# Patient Record
Sex: FEMALE | Race: AMERICAN INDIAN OR ALASKA NATIVE
[De-identification: names, ages, dates, MRNs, and addresses within clinical notes are randomized per-mention and may not be internally consistent; named-entity substitution may affect disease eponyms.]

---

## 2019-03-29 NOTE — XRAY REPORT
PROCEDURES:  XR CHEST 1V AP 

 

TECHNIQUE:  AP portable view of the chest. 

 

HISTORY: sob 

 

COMPARISON:  None 

 

FINDINGS: 

 

Lines, tubes, and devices: N/A 

 

Lungs and pleura: Trachea is normal in position. Lungs are clear of infiltrate, pleural effusion, vas
cular congestion, or pneumothorax. Elevation of the left hemidiaphragm is noted. 

 

Cardiomediastinal silhouette: Aorta is unfolded, likely age-related. Cardiac and mediastinal silhouet
rito are otherwise unremarkable. 

 

Other: Bony structures are intact. 

 

 

IMPRESSION: 

 

No acute cardiopulmonary process seen. 

 

This document is electronically signed by Jossie Asher MD., March 29 2019 04:50:50 PM ET

## 2019-03-29 NOTE — EMERGENCY DEPARTMENT REPORT
ED General Adult HPI





- General


Chief complaint: Dizziness


Stated complaint: FEELING FAINT


Time Seen by Provider: 03/29/19 14:51


Source: patient


Mode of arrival: Stretcher


Limitations: No Limitations





- History of Present Illness


Initial comments: 





87-year-old female with history of dementia, CVA, PVCs, presents to ED with 

complaint of dizziness and shortness of breath.  The patient was outpatient 

imaging to undergo brain MRI and she began feeling lightheaded as if she is 

gonna pass out.  Patient reports associated shortness of breath.  Denies 

headache, chest pain, vomiting, diarrhea, fever, abdominal pain.


-: This afternoon


Severity scale (0 -10): 0


Consistency: intermittent


Improves with: none


Worsens with: none


Associated Symptoms: shortness of breath.  denies: chest pain, cough, 

fever/chills, headaches, nausea/vomiting





- Related Data


                                    Allergies











Allergy/AdvReac Type Severity Reaction Status Date / Time


 


No Known Allergies Allergy   Unverified 03/29/19 15:01














ED Review of Systems


ROS: 


Stated complaint: FEELING FAINT


Other details as noted in HPI





Comment: All other systems reviewed and negative


Constitutional: denies: chills, fever


Respiratory: shortness of breath.  denies: cough


Cardiovascular: denies: chest pain


Gastrointestinal: denies: abdominal pain, nausea, vomiting, diarrhea


Neurological: denies: headache





ED Past Medical Hx





- Past Medical History


Previous Medical History?: Yes


Additional medical history: bld clots, a-fib





- Social History


Smoking Status: Never Smoker


Substance Use Type: None





ED Physical Exam





- General


Limitations: No Limitations


General appearance: alert, in no apparent distress





- Head


Head exam: Present: atraumatic, normocephalic





- Eye


Eye exam: Present: normal appearance





- ENT


ENT exam: Present: mucous membranes moist





- Neck


Neck exam: Present: normal inspection





- Respiratory


Respiratory exam: Present: normal lung sounds bilaterally.  Absent: respiratory 

distress





- Cardiovascular


Cardiovascular Exam: Present: regular rate, normal rhythm





- GI/Abdominal


GI/Abdominal exam: Present: soft.  Absent: distended, tenderness





- Extremities Exam


Extremities exam: Present: normal inspection





- Neurological Exam


Neurological exam: Present: alert.  Absent: oriented X3 (oriented x 2, baseline 

per daughter)





- Psychiatric


Psychiatric exam: Present: normal affect, normal mood





- Skin


Skin exam: Present: warm, dry, intact, normal color





ED Course


                                   Vital Signs











  03/29/19 03/29/19 03/29/19





  14:51 15:00 15:01


 


Temperature   98.0 F


 


Pulse Rate  95 H 99 H


 


Respiratory 16 21 16





Rate   


 


Blood Pressure  142/91 143/92


 


O2 Sat by Pulse   98





Oximetry   














  03/29/19 03/29/19 03/29/19





  15:30 15:46 16:16


 


Temperature   


 


Pulse Rate 91 H 83 80


 


Respiratory 20 19 16





Rate   


 


Blood Pressure 146/85 142/91 135/85


 


O2 Sat by Pulse   





Oximetry   














  03/29/19 03/29/19 03/29/19





  16:46 18:08 18:16


 


Temperature   


 


Pulse Rate 81 92 H 77


 


Respiratory 11 L 21 22





Rate   


 


Blood Pressure 138/85 143/66 154/95


 


O2 Sat by Pulse   





Oximetry   














  03/29/19 03/29/19 03/29/19





  18:30 18:46 19:00


 


Temperature   


 


Pulse Rate 76 80 95 H


 


Respiratory 15 13 16





Rate   


 


Blood Pressure 143/66 141/82 141/82


 


O2 Sat by Pulse   





Oximetry   














- Reevaluation(s)


Reevaluation #1: 





03/29/19 19:09


Explained CT findings to daughter.  States she has known PE and is currently on 

Eliquis.





ED Medical Decision Making





- Lab Data


Result diagrams: 


                                 03/29/19 15:43





                                 03/29/19 15:43





- EKG Data


-: EKG Interpreted by Me


EKG shows normal: sinus rhythm, ST-T waves





- EKG Data


Interpretation: other (bigeminy)





- Radiology Data


Radiology results: report reviewed





- Medical Decision Making





87-year-old female with dementia complaining of dizziness and shortness of 

breath.  Patient denies respiratory distress, lungs are clear, O2 sats normal.  

Vital signs normal.  EKG shows bigeminy, however on the monitor, patient goes in

and out of bigeminy and sinus rhythm with occasional PVCs.  Daughter reports 

patient has issues PVCs.  Patient denies chest pain, troponin negative.  UA 

shows UTI, rocephin given.  CT head negative.  CTA Chest obtained due to 

elevated D-dimer.  CTA Chest shows subsegmental clots, but no large, saddle PE. 

Daughter then states pt has history of PE and currently taking Eliquis.  Spoke 

w/ hospitalist, Dr Feliciano, will admit for further workup.  States he will place 

orders for anticoagulation.  Requests bridge orders be placed.





- Differential Diagnosis


pneumonia, CHF, ACS, CVA, PE


Critical care attestation.: 


If time is entered above; I have spent that time in minutes in the direct care 

of this critically ill patient, excluding procedure time.








ED Disposition


Clinical Impression: 


 Dizziness, Pulmonary embolism, UTI (urinary tract infection)





Disposition: DC-[id number] OP ADMIT IP TO THIS HOSP


Is pt being admited?: Yes


Condition: Stable


Referrals: 


[facility] [Other] - 3-5 Days


Time of Disposition: 19:13

## 2019-03-29 NOTE — CAT SCAN REPORT
PROCEDURE: CT ANGIO CHEST 

 

TECHNIQUE:  Following administration of IV contrast axial helical imaging was performed through the c
hest with sagittal and coronal reformatted images and maximum intensity projection images obtained. 

 

HISTORY: sob, elevated d-dimer 

 

COMPARISONS: Chest x-ray also performed today  

 

FINDINGS: 

There is no evidence of infiltrate, pneumothorax or pleural fluid collection. 

The trachea and bronchi are patent. 

 

The heart is enlarged. There is a small pericardial fluid collection. 

There is aneurysmal dilatation of the ascending thoracic aorta (4.4 cm) and measured at the level of 
the right pulmonary artery. 

There is no evidence of intrathoracic adenopathy. 

 

There are filling defects in the right lower lobe lobar and segmental pulmonary arteries consistent w
ith pulmonary artery emboli. There is also the appearance of a small pulmonary embolus in a left lowe
r lobe segmental pulmonary artery. 

 

The visualized portion of the upper abdomen is notable for well-defined areas of decreased density wi
thin the liver most likely represent cysts. 

The bony structures are are notable for spondylitic change of the thoracic spine. 

 

IMPRESSION:  

1. Evidence of pulmonary artery emboli. 

2. Cardiomegaly with small pericardial fluid collection. 

3. Aneurysmal dilatation of ascending thoracic aorta (4.4 cm). 

The above findings were discussed with Dr. Porras at 7:05 PM March 29, 2018. 

 

This document is electronically signed by Mignon Boland MD., March 29 2019 07:06:12 PM ET

## 2019-03-29 NOTE — CAT SCAN REPORT
PROCEDURE: CT HEAD/BRAIN WO CON 

 

TECHNIQUE:  Axial helical imaging from the skull base to the vertex. 

 

HISTORY: dizziness 

 

COMPARISONS: None  

 

FINDINGS: 

There is no evidence of an acute intracranial process, intracranial hemorrhage or mass effect. 

Ventricular size is concordant with the degree of atrophy. 

There is atherosclerotic vascular calcification of the internal carotid arteries and vertebral arteri
es bilaterally at the skull base. 

The visualized portions of the orbits, paranasal and mastoid sinuses are unremarkable. 

The bony structures are unremarkable. 

 

IMPRESSION: 

1. No evidence of an acute intracranial process, intracranial hemorrhage or mass effect. 

If there is a clinical suspicion of an acute intracranial process, MRI brain may be helpful. 

 

This document is electronically signed by Mignon Boland MD., March 29 2019 06:37:09 PM ET

## 2019-03-30 NOTE — VASCULAR LAB REPORT
VL VENOUS DUPLEX LE BILAT 

 

CLINICAL INDICATION: 

Female, 87 years of age. Pulmonary embolism . 

 

COMPARISON: 

None 

 

TECHNIQUE: 

Multiple gray-scale, color-flow and doppler waveform images were saved for the permanent digital medi
clair record. 

 

FINDINGS: 

The deep veins of the lower extremities are normal in appearance throughout.  Normal venous flow and 
compressibility is noted.  The visualized veins of the calves are also normal. 

 

IMPRESSION: 

No evidence of deep venous thrombosis of either lower extremity. 

 

This document is electronically signed by Yuliana Salas DO., March 30 2019 04:08:23 PM ET

## 2019-03-30 NOTE — PROGRESS NOTE
Assessment and Plan


Assessment and plan: 


Acute resp failure due to bilateral pulmonary embolism


Supplemental Oxygen


consult Pulmonology





Pulmonary embolism


Started on Lovenox 1mg/kg bid


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


Hold Eliquis.


Consulted pulm


will discuss with Pulm later.





History of Pulmonary embolism





History of DVT, details unavailable





Atrial fibrillation





UTI


Started on Ceftriaxone





Hypokalemia.


replace and recheck





Dementia.





FULL CODE STATUS.








History


Interval history: 


Shortness of breath


Chest pain








Hospitalist Physical





- Physical exam


Narrative exam: 


GEN: Not in acute distress, lying in bed, 


HEENT: Normocephalic, atraumatic, 


Neck: supple, No JVD


heart: S1 and S2 reg, no murmurs, rubs or gallop


Lungs: clear to auscultation bilateral, no crackles, no wheeze 


Abd:soft, non tender, non distended, normal bowel sounds


Ext:No edema, no clubbing


Neuro:Awake,alert,oriented X 3, no focal signs, moves all ext


Psych: normal mood











- Constitutional


Vitals: 


                                        











Temp Pulse Resp BP Pulse Ox


 


 97.1 F L  80   18   148/96   99 


 


 03/30/19 08:21  03/30/19 04:04  03/30/19 08:21  03/30/19 08:21  03/30/19 04:04














Results





- Labs


CBC & Chem 7: 


                                 03/30/19 12:54





                                 03/30/19 05:38


Labs: 


                             Laboratory Last Values











WBC  3.1 K/mm3 (4.5-11.0)  L  03/29/19  15:43    


 


RBC  4.48 M/mm3 (3.65-5.03)   03/29/19  15:43    


 


Hgb  13.0 gm/dl (10.1-14.3)   03/29/19  15:43    


 


Hct  38.3 % (30.3-42.9)   03/29/19  15:43    


 


MCV  86 fl (79-97)   03/29/19  15:43    


 


MCH  29 pg (28-32)   03/29/19  15:43    


 


MCHC  34 % (30-34)   03/29/19  15:43    


 


RDW  16.2 % (13.2-15.2)  H  03/29/19  15:43    


 


Plt Count  219 K/mm3 (140-440)   03/29/19  15:43    


 


Add Manual Diff  Complete   03/29/19  15:43    


 


Total Counted  100   03/29/19  15:43    


 


Seg Neuts % (Manual)  54.0 % (40.0-70.0)   03/29/19  15:43    


 


Band Neutrophils %  0 %  03/29/19  15:43    


 


Lymphocytes % (Manual)  35.0 % (13.4-35.0)   03/29/19  15:43    


 


Reactive Lymphs % (Man)  0 %  03/29/19  15:43    


 


Monocytes % (Manual)  11.0 % (0.0-7.3)  H  03/29/19  15:43    


 


Eosinophils % (Manual)  0 % (0.0-4.3)   03/29/19  15:43    


 


Basophils % (Manual)  0 % (0.0-1.8)   03/29/19  15:43    


 


Metamyelocytes %  0 %  03/29/19  15:43    


 


Myelocytes %  0 %  03/29/19  15:43    


 


Promyelocytes %  0 %  03/29/19  15:43    


 


Blast Cells %  0 %  03/29/19  15:43    


 


Nucleated RBC %  Not Reportable   03/29/19  15:43    


 


Seg Neutrophils # Man  1.7 K/mm3 (1.8-7.7)  L  03/29/19  15:43    


 


Band Neutrophils #  0.0 K/mm3  03/29/19  15:43    


 


Lymphocytes # (Manual)  1.1 K/mm3 (1.2-5.4)  L  03/29/19  15:43    


 


Abs React Lymphs (Man)  0.0 K/mm3  03/29/19  15:43    


 


Monocytes # (Manual)  0.3 K/mm3 (0.0-0.8)   03/29/19  15:43    


 


Eosinophils # (Manual)  0.0 K/mm3 (0.0-0.4)   03/29/19  15:43    


 


Basophils # (Manual)  0.0 K/mm3 (0.0-0.1)   03/29/19  15:43    


 


Metamyelocytes #  0.0 K/mm3  03/29/19  15:43    


 


Myelocytes #  0.0 K/mm3  03/29/19  15:43    


 


Promyelocytes #  0.0 K/mm3  03/29/19  15:43    


 


Blast Cells #  0.0 K/mm3  03/29/19  15:43    


 


WBC Morphology  Not Reportable   03/29/19  15:43    


 


Hypersegmented Neuts  Not Reportable   03/29/19  15:43    


 


Hyposegmented Neuts  Not Reportable   03/29/19  15:43    


 


Hypogranular Neuts  Not Reportable   03/29/19  15:43    


 


Smudge Cells  Not Reportable   03/29/19  15:43    


 


Toxic Granulation  Not Reportable   03/29/19  15:43    


 


Toxic Vacuolation  Not Reportable   03/29/19  15:43    


 


Dohle Bodies  Not Reportable   03/29/19  15:43    


 


Pelger-Huet Anomaly  Not Reportable   03/29/19  15:43    


 


Gabino Rods  Not Reportable   03/29/19  15:43    


 


Platelet Estimate  Consistent w auto   03/29/19  15:43    


 


Clumped Platelets  Not Reportable   03/29/19  15:43    


 


Plt Clumps, EDTA  Not Reportable   03/29/19  15:43    


 


Large Platelets  Not Reportable   03/29/19  15:43    


 


Giant Platelets  Not Reportable   03/29/19  15:43    


 


Platelet Satelliting  Not Reportable   03/29/19  15:43    


 


Plt Morphology Comment  Not Reportable   03/29/19  15:43    


 


RBC Morphology  Not Reportable   03/29/19  15:43    


 


Dimorphic RBCs  Not Reportable   03/29/19  15:43    


 


Polychromasia  Not Reportable   03/29/19  15:43    


 


Hypochromasia  Not Reportable   03/29/19  15:43    


 


Poikilocytosis  1+   03/29/19  15:43    


 


Anisocytosis  1+   03/29/19  15:43    


 


Microcytosis  Not Reportable   03/29/19  15:43    


 


Macrocytosis  Not Reportable   03/29/19  15:43    


 


Spherocytes  Not Reportable   03/29/19  15:43    


 


Pappenheimer Bodies  Not Reportable   03/29/19  15:43    


 


Sickle Cells  Not Reportable   03/29/19  15:43    


 


Target Cells  Not Reportable   03/29/19  15:43    


 


Tear Drop Cells  Not Reportable   03/29/19  15:43    


 


Ovalocytes  Few   03/29/19  15:43    


 


Helmet Cells  Not Reportable   03/29/19  15:43    


 


[place] Bodies  Not Reportable   03/29/19  15:43    


 


Cabot Rings  Not Reportable   03/29/19  15:43    


 


Kendal Cells  Not Reportable   03/29/19  15:43    


 


Bite Cells  Not Reportable   03/29/19  15:43    


 


Crenated Cell  Not Reportable   03/29/19  15:43    


 


Elliptocytes  Few   03/29/19  15:43    


 


Acanthocytes (Spur)  Not Reportable   03/29/19  15:43    


 


Rouleaux  Not Reportable   03/29/19  15:43    


 


Hemoglobin C Crystals  Not Reportable   03/29/19  15:43    


 


Schistocytes  Not Reportable   03/29/19  15:43    


 


Malaria parasites  Not Reportable   03/29/19  15:43    


 


Michael Bodies  Not Reportable   03/29/19  15:43    


 


Hem Pathologist Commnt  No   03/29/19  15:43    


 


PT  13.6 Sec. (12.2-14.9)   03/29/19  15:43    


 


INR  0.98  (0.87-1.13)   03/29/19  15:43    


 


APTT  25.1 Sec. (24.2-36.6)   03/29/19  15:43    


 


D-Dimer  1149.82 ng/mlDDU (0-234)  H  03/29/19  15:43    


 


Sodium  142 mmol/L (137-145)   03/30/19  05:38    


 


Potassium  3.0 mmol/L (3.6-5.0)  L  03/30/19  05:38    


 


Chloride  100.7 mmol/L ()   03/30/19  05:38    


 


Carbon Dioxide  23 mmol/L (22-30)   03/30/19  05:38    


 


Anion Gap  21 mmol/L  03/30/19  05:38    


 


BUN  5 mg/dL (7-17)  L  03/30/19  05:38    


 


Creatinine  0.4 mg/dL (0.7-1.2)  L  03/30/19  05:38    


 


Estimated GFR  > 60 ml/min  03/30/19  05:38    


 


BUN/Creatinine Ratio  13 %  03/30/19  05:38    


 


Glucose  83 mg/dL ()   03/30/19  05:38    


 


Hemoglobin A1c  4.8 % (4-6)   03/30/19  00:27    


 


Calcium  8.8 mg/dL (8.4-10.2)   03/30/19  05:38    


 


Total Bilirubin  0.70 mg/dL (0.1-1.2)   03/30/19  05:38    


 


AST  21 units/L (5-40)   03/30/19  05:38    


 


ALT  6 units/L (7-56)  L  03/30/19  05:38    


 


Alkaline Phosphatase  39 units/L ()   03/30/19  05:38    


 


Troponin T  < 0.010 ng/mL (0.00-0.029)   03/29/19  21:19    


 


Total Protein  5.6 g/dL (6.3-8.2)  L  03/30/19  05:38    


 


Albumin  2.9 g/dL (3.9-5)  L  03/30/19  05:38    


 


Albumin/Globulin Ratio  1.1 %  03/30/19  05:38    


 


Urine Color  Yellow  (Yellow)   03/29/19  19:00    


 


Urine Turbidity  Clear  (Clear)   03/29/19  19:00    


 


Urine pH  6.0  (5.0-7.0)   03/29/19  19:00    


 


Urine Protein  <15 mg/dl mg/dL (Negative)   03/29/19  19:00    


 


Urine Glucose (UA)  Neg mg/dL (Negative)   03/29/19  19:00    


 


Urine Ketones  80 mg/dL (Negative)   03/29/19  19:00    


 


Urine Blood  Neg  (Negative)   03/29/19  19:00    


 


Urine Nitrite  Neg  (Negative)   03/29/19  19:00    


 


Urine Bilirubin  Neg  (Negative)   03/29/19  19:00    


 


Urine Urobilinogen  4.0 mg/dL (<2.0)   03/29/19  19:00    


 


Ur Leukocyte Esterase  Mod  (Negative)   03/29/19  19:00    


 


Urine WBC (Auto)  27.0 /HPF (0.0-6.0)  H  03/29/19  19:00    


 


Urine RBC (Auto)  5.0 /HPF (0.0-6.0)   03/29/19  19:00    


 


U Epithel Cells (Auto)  1.0 /HPF (0-13.0)   03/29/19  19:00    


 


Urine Mucus  2+ /HPF  03/29/19  19:00    














Active Medications





- Current Medications


Current Medications: 














Generic Name Dose Route Start Last Admin





  Trade Name Freq  PRN Reason Stop Dose Admin


 


Acetaminophen  650 mg  03/29/19 23:26 





  Tylenol  PO  





  Q4H PRN  





  Pain MILD(1-3)/Fever >100.5/HA  


 


Apixaban  5 mg  03/30/19 10:00  03/30/19 09:15





  Eliquis  PO   5 mg





  DAILY GERARDO   Administration





  Protocol  


 


Enoxaparin Sodium  60 mg  03/30/19 10:00  03/30/19 09:14





  Lovenox  SUB-Q   60 mg





  BID GERARDO   Administration


 


Hydromorphone HCl  0.25 mg  03/29/19 23:26 





  Dilaudid  IV  





  Q3H PRN  





  Pain, Moderate (4-6)  


 


Sodium Chloride  1,000 mls @ 75 mls/hr  03/29/19 23:45  03/29/19 23:54





  Nacl 0.9% 1000 Ml  IV   75 mls/hr





  AS DIRECT GERARDO   Administration


 


Ceftriaxone Sodium  1 gm in 50 mls @ 100 mls/hr  03/30/19 10:00  03/30/19 09:56





  Rocephin/Ns 1 Gm/50 Ml  IV   100 mls/hr





  Q24HR GERARDO   Administration





  Protocol  


 


Ondansetron HCl  4 mg  03/29/19 23:26  03/30/19 09:32





  Zofran  IV   4 mg





  Q8H PRN   Administration





  Nausea And Vomiting  


 


Oxycodone/Acetaminophen  1 tab  03/29/19 23:26  03/30/19 09:32





  Percocet 5/325  PO   1 tab





  Q6H PRN   Administration





  Pain, Moderate (4-6)  


 


Potassium Chloride  40 meq  03/30/19 11:00 





  Potassium Chloride  FEEDTUBE  03/30/19 17:01 





  Q6H GERARDO  


 


Sodium Chloride  10 ml  03/30/19 10:00  03/30/19 09:38





  Sodium Chloride Flush Syringe 10 Ml  IV   10 ml





  BID GERARDO   Administration


 


Sodium Chloride  10 ml  03/29/19 23:26 





  Sodium Chloride Flush Syringe 10 Ml  IV  





  PRN PRN  





  LINE FLUSH

## 2019-03-30 NOTE — HISTORY AND PHYSICAL REPORT
History of Present Illness


Date of examination: 03/29/19


Date of admission: 


03/29/19 19:14





Chief complaint: 


SOB since am





History of present illness: 





87-year-old female with history of dementia, CVA, PVCs, presents to ED with 

complaint of dizziness and shortness of breath.  The patient was  at outpatient 

imaging to undergo brain MRI and she began feeling lightheaded as if she is 

gonna pass out.  Patient reports associated shortness of breath.  Denies 

headache, chest pain, vomiting, diarrhea, fever, abdominal pain.


Unable to do her ADL's.


Lives with Grand daughter


Daughter does not want SNF at this point of time








Past Medical History  


Previous Medical History?: Yes


Additional medical history: bld clots, a-fib,PE  





Past surgical history


n/a





Social History  


Smoking Status: Never Smoker


Substance Use Type: None  





 Family History


Htn











Review of Systems


ROS: 


Stated complaint: FEELING FAINT


Other details as noted in HPI





Comment: All other systems reviewed and negative


Constitutional: denies: chills, fever


Respiratory: shortness of breath.  denies: cough


Cardiovascular: denies: chest pain


Gastrointestinal: denies: abdominal pain, nausea, vomiting, diarrhea


Neurological: denies: headache














Medications and Allergies


                                    Allergies











Allergy/AdvReac Type Severity Reaction Status Date / Time


 


No Known Allergies Allergy   Verified 03/29/19 23:19











                                Home Medications











 Medication  Instructions  Recorded  Confirmed  Last Taken  Type


 


Apixaban [Eliquis] 5 mg PO DAILY 03/29/19 03/29/19 Unknown History











Active Meds: 


Active Medications





Acetaminophen (Tylenol)  650 mg PO Q4H PRN


   PRN Reason: Pain MILD(1-3)/Fever >100.5/HA


Apixaban (Eliquis)  5 mg PO DAILY [facility]; Protocol


Enoxaparin Sodium (Lovenox)  60 mg SUB-Q BID [facility]


Hydromorphone HCl (Dilaudid)  0.25 mg IV Q3H PRN


   PRN Reason: Pain, Moderate (4-6)


Sodium Chloride (Nacl 0.9% 1000 Ml)  1,000 mls @ 75 mls/hr IV AS DIRECT [facility]


   Last Admin: 03/29/19 23:54 Dose:  75 mls/hr


   Documented by: 


Ondansetron HCl (Zofran)  4 mg IV Q8H PRN


   PRN Reason: Nausea And Vomiting


Oxycodone/Acetaminophen (Percocet 5/325)  1 tab PO Q6H PRN


   PRN Reason: Pain, Moderate (4-6)


Sodium Chloride (Sodium Chloride Flush Syringe 10 Ml)  10 ml IV BID [facility]ARDO


Sodium Chloride (Sodium Chloride Flush Syringe 10 Ml)  10 ml IV PRN PRN


   PRN Reason: LINE FLUSH











Exam





- Constitutional


Vitals: 


                                        











Temp Pulse Resp BP Pulse Ox


 


 97.6 F   80   18   146/86   99 


 


 03/30/19 04:04  03/30/19 04:04  03/30/19 04:04  03/30/19 04:04  03/30/19 04:04











General appearance: Present: mild distress, well-nourished





- EENT


Eyes: Present: PERRL


ENT: hearing intact, clear oral mucosa





- Neck


Neck: Present: supple, normal ROM





- Respiratory


Respiratory effort: normal


Respiratory: bilateral: CTA





- Cardiovascular


Heart rate: 92


Rhythm: regular


Heart Sounds: Present: S1 & S2.  Absent: rub, click





- Extremities


Extremities: no ischemia, pulses intact, pulses symmetrical, No edema


Peripheral Pulses: within normal limits





- Abdominal


General gastrointestinal: Present: soft, non-tender, non-distended, normal bowel

 sounds


Female genitourinary: Present: normal





- Rectal


Rectal Exam: deferred





- Integumentary


Integumentary: Present: clear, warm, dry





- Musculoskeletal


Musculoskeletal: gait normal, strength equal bilaterally





- Psychiatric


Psychiatric: appropriate mood/affect, intact judgment & insight





- Neurologic


Neurologic: CNII-XII intact, moves all extremities





- Allied Health


Allied health notes reviewed: nursing, case management





Results





- Labs


CBC & Chem 7: 


                                 03/29/19 15:43





                                 03/30/19 05:38


Labs: 


                             Laboratory Last Values











WBC  3.1 K/mm3 (4.5-11.0)  L  03/29/19  15:43    


 


RBC  4.48 M/mm3 (3.65-5.03)   03/29/19  15:43    


 


Hgb  13.0 gm/dl (10.1-14.3)   03/29/19  15:43    


 


Hct  38.3 % (30.3-42.9)   03/29/19  15:43    


 


MCV  86 fl (79-97)   03/29/19  15:43    


 


MCH  29 pg (28-32)   03/29/19  15:43    


 


MCHC  34 % (30-34)   03/29/19  15:43    


 


RDW  16.2 % (13.2-15.2)  H  03/29/19  15:43    


 


Plt Count  219 K/mm3 (140-440)   03/29/19  15:43    


 


Add Manual Diff  Complete   03/29/19  15:43    


 


Total Counted  100   03/29/19  15:43    


 


Seg Neuts % (Manual)  54.0 % (40.0-70.0)   03/29/19  15:43    


 


Band Neutrophils %  0 %  03/29/19  15:43    


 


Lymphocytes % (Manual)  35.0 % (13.4-35.0)   03/29/19  15:43    


 


Reactive Lymphs % (Man)  0 %  03/29/19  15:43    


 


Monocytes % (Manual)  11.0 % (0.0-7.3)  H  03/29/19  15:43    


 


Eosinophils % (Manual)  0 % (0.0-4.3)   03/29/19  15:43    


 


Basophils % (Manual)  0 % (0.0-1.8)   03/29/19  15:43    


 


Metamyelocytes %  0 %  03/29/19  15:43    


 


Myelocytes %  0 %  03/29/19  15:43    


 


Promyelocytes %  0 %  03/29/19  15:43    


 


Blast Cells %  0 %  03/29/19  15:43    


 


Nucleated RBC %  Not Reportable   03/29/19  15:43    


 


Seg Neutrophils # Man  1.7 K/mm3 (1.8-7.7)  L  03/29/19  15:43    


 


Band Neutrophils #  0.0 K/mm3  03/29/19  15:43    


 


Lymphocytes # (Manual)  1.1 K/mm3 (1.2-5.4)  L  03/29/19  15:43    


 


Abs React Lymphs (Man)  0.0 K/mm3  03/29/19  15:43    


 


Monocytes # (Manual)  0.3 K/mm3 (0.0-0.8)   03/29/19  15:43    


 


Eosinophils # (Manual)  0.0 K/mm3 (0.0-0.4)   03/29/19  15:43    


 


Basophils # (Manual)  0.0 K/mm3 (0.0-0.1)   03/29/19  15:43    


 


Metamyelocytes #  0.0 K/mm3  03/29/19  15:43    


 


Myelocytes #  0.0 K/mm3  03/29/19  15:43    


 


Promyelocytes #  0.0 K/mm3  03/29/19  15:43    


 


Blast Cells #  0.0 K/mm3  03/29/19  15:43    


 


WBC Morphology  Not Reportable   03/29/19  15:43    


 


Hypersegmented Neuts  Not Reportable   03/29/19  15:43    


 


Hyposegmented Neuts  Not Reportable   03/29/19  15:43    


 


Hypogranular Neuts  Not Reportable   03/29/19  15:43    


 


Smudge Cells  Not Reportable   03/29/19  15:43    


 


Toxic Granulation  Not Reportable   03/29/19  15:43    


 


Toxic Vacuolation  Not Reportable   03/29/19  15:43    


 


Dohle Bodies  Not Reportable   03/29/19  15:43    


 


Pelger-Huet Anomaly  Not Reportable   03/29/19  15:43    


 


Gabino Rods  Not Reportable   03/29/19  15:43    


 


Platelet Estimate  Consistent w auto   03/29/19  15:43    


 


Clumped Platelets  Not Reportable   03/29/19  15:43    


 


Plt Clumps, EDTA  Not Reportable   03/29/19  15:43    


 


Large Platelets  Not Reportable   03/29/19  15:43    


 


Giant Platelets  Not Reportable   03/29/19  15:43    


 


Platelet Satelliting  Not Reportable   03/29/19  15:43    


 


Plt Morphology Comment  Not Reportable   03/29/19  15:43    


 


RBC Morphology  Not Reportable   03/29/19  15:43    


 


Dimorphic RBCs  Not Reportable   03/29/19  15:43    


 


Polychromasia  Not Reportable   03/29/19  15:43    


 


Hypochromasia  Not Reportable   03/29/19  15:43    


 


Poikilocytosis  1+   03/29/19  15:43    


 


Anisocytosis  1+   03/29/19  15:43    


 


Microcytosis  Not Reportable   03/29/19  15:43    


 


Macrocytosis  Not Reportable   03/29/19  15:43    


 


Spherocytes  Not Reportable   03/29/19  15:43    


 


Pappenheimer Bodies  Not Reportable   03/29/19  15:43    


 


Sickle Cells  Not Reportable   03/29/19  15:43    


 


Target Cells  Not Reportable   03/29/19  15:43    


 


Tear Drop Cells  Not Reportable   03/29/19  15:43    


 


Ovalocytes  Few   03/29/19  15:43    


 


Helmet Cells  Not Reportable   03/29/19  15:43    


 


Avendaño-Guadalupe Bodies  Not Reportable   03/29/19  15:43    


 


Cabot Rings  Not Reportable   03/29/19  15:43    


 


Kendal Cells  Not Reportable   03/29/19  15:43    


 


Bite Cells  Not Reportable   03/29/19  15:43    


 


Crenated Cell  Not Reportable   03/29/19  15:43    


 


Elliptocytes  Few   03/29/19  15:43    


 


Acanthocytes (Spur)  Not Reportable   03/29/19  15:43    


 


Rouleaux  Not Reportable   03/29/19  15:43    


 


Hemoglobin C Crystals  Not Reportable   03/29/19  15:43    


 


Schistocytes  Not Reportable   03/29/19  15:43    


 


Malaria parasites  Not Reportable   03/29/19  15:43    


 


Michael Bodies  Not Reportable   03/29/19  15:43    


 


Hem Pathologist Commnt  No   03/29/19  15:43    


 


PT  13.6 Sec. (12.2-14.9)   03/29/19  15:43    


 


INR  0.98  (0.87-1.13)   03/29/19  15:43    


 


APTT  25.1 Sec. (24.2-36.6)   03/29/19  15:43    


 


D-Dimer  1149.82 ng/mlDDU (0-234)  H  03/29/19  15:43    


 


Sodium  142 mmol/L (137-145)   03/30/19  05:38    


 


Potassium  3.0 mmol/L (3.6-5.0)  L  03/30/19  05:38    


 


Chloride  100.7 mmol/L ()   03/30/19  05:38    


 


Carbon Dioxide  23 mmol/L (22-30)   03/30/19  05:38    


 


Anion Gap  21 mmol/L  03/30/19  05:38    


 


BUN  5 mg/dL (7-17)  L  03/30/19  05:38    


 


Creatinine  0.4 mg/dL (0.7-1.2)  L  03/30/19  05:38    


 


Estimated GFR  > 60 ml/min  03/30/19  05:38    


 


BUN/Creatinine Ratio  13 %  03/30/19  05:38    


 


Glucose  83 mg/dL ()   03/30/19  05:38    


 


Hemoglobin A1c  4.8 % (4-6)   03/30/19  00:27    


 


Calcium  8.8 mg/dL (8.4-10.2)   03/30/19  05:38    


 


Total Bilirubin  0.70 mg/dL (0.1-1.2)   03/30/19  05:38    


 


AST  21 units/L (5-40)   03/30/19  05:38    


 


ALT  6 units/L (7-56)  L  03/30/19  05:38    


 


Alkaline Phosphatase  39 units/L ()   03/30/19  05:38    


 


Troponin T  < 0.010 ng/mL (0.00-0.029)   03/29/19  21:19    


 


Total Protein  5.6 g/dL (6.3-8.2)  L  03/30/19  05:38    


 


Albumin  2.9 g/dL (3.9-5)  L  03/30/19  05:38    


 


Albumin/Globulin Ratio  1.1 %  03/30/19  05:38    


 


Urine Color  Yellow  (Yellow)   03/29/19  19:00    


 


Urine Turbidity  Clear  (Clear)   03/29/19  19:00    


 


Urine pH  6.0  (5.0-7.0)   03/29/19  19:00    


 


Urine Protein  <15 mg/dl mg/dL (Negative)   03/29/19  19:00    


 


Urine Glucose (UA)  Neg mg/dL (Negative)   03/29/19  19:00    


 


Urine Ketones  80 mg/dL (Negative)   03/29/19  19:00    


 


Urine Blood  Neg  (Negative)   03/29/19  19:00    


 


Urine Nitrite  Neg  (Negative)   03/29/19  19:00    


 


Urine Bilirubin  Neg  (Negative)   03/29/19  19:00    


 


Urine Urobilinogen  4.0 mg/dL (<2.0)   03/29/19  19:00    


 


Ur Leukocyte Esterase  Mod  (Negative)   03/29/19  19:00    


 


Urine WBC (Auto)  27.0 /HPF (0.0-6.0)  H  03/29/19  19:00    


 


Urine RBC (Auto)  5.0 /HPF (0.0-6.0)   03/29/19  19:00    


 


U Epithel Cells (Auto)  1.0 /HPF (0-13.0)   03/29/19  19:00    


 


Urine Mucus  2+ /HPF  03/29/19  19:00    








                                    Short CBC











  03/29/19 Range/Units





  15:43 


 


WBC  3.1 L  (4.5-11.0)  K/mm3


 


Hgb  13.0  (10.1-14.3)  gm/dl


 


Hct  38.3  (30.3-42.9)  %


 


Plt Count  219  (140-440)  K/mm3








                                       BMP











  03/29/19 03/30/19





  15:43 05:38


 


Sodium  142  142


 


Potassium  3.6  3.0 L


 


Chloride  97.4 L  100.7


 


Carbon Dioxide  25  23


 


BUN  8  5 L


 


Creatinine  0.5 L  0.4 L


 


Glucose  103 H  83


 


Calcium  9.5  8.8








                                 Cardiac Enzymes











  03/29/19 03/29/19 Range/Units





  15:43 21:19 


 


Troponin T  < 0.010  < 0.010  (0.00-0.029)  ng/mL








                                 Liver Function











  03/30/19 Range/Units





  05:38 


 


Total Bilirubin  0.70  (0.1-1.2)  mg/dL


 


AST  21  (5-40)  units/L


 


ALT  6 L  (7-56)  units/L


 


Alkaline Phosphatase  39  ()  units/L


 


Albumin  2.9 L  (3.9-5)  g/dL








                                      Urine











  03/29/19 Range/Units





  19:00 


 


Urine Color  Yellow  (Yellow)  


 


Urine pH  6.0  (5.0-7.0)  


 


Urine Protein  <15 mg/dl  (Negative)  mg/dL


 


Urine Glucose (UA)  Neg  (Negative)  mg/dL














- Imaging and Cardiology


EKG: report reviewed (NSR 92//min Ventricular Bigeminy)


Chest x-ray: report reviewed (NAF)


Imaging and Cardiology: 


CTA Chest





IMPRESSION: 1. Evidence of pulmonary artery emboli. 2. Cardiomegaly with small 

pericardial fluid collection. 3. Aneurysmal dilatation of ascending thoracic 

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 The above findings were discussed with Dr. Porras at 7:05 PM March 29, 2018. 





Head CT





IMPRESSION: 1. No evidence of an acute intracranial process, intracranial 

hemorrhage or mass effect. If there is a clinical suspicion of an acute 

intracranial process, MRI brain may be helpful. 











Assessment and Plan


Advance Directives: Yes (Full code)


VTE prophylaxis?: Chemical


Plan of care discussed with patient/family: Yes





- Patient Problems


(1) Acute pulmonary embolism


Current Visit: Yes   Status: Acute   


Qualifiers: 


   Acute cor pulmonale presence: without acute cor pulmonale 


Plan to address problem: 


Initiated on Lovenox


No EKOS


On Wliquis for Afib


Cont Eliquis








(2) UTI (urinary tract infection)


Current Visit: Yes   Status: Acute   


Qualifiers: 


   Urinary tract infection type: acute cystitis 


Plan to address problem: 


on Ceftriaxone








(3) A-fib


Current Visit: Yes   Status: Chronic   


Qualifiers: 


   Atrial fibrillation type: paroxysmal   Qualified Code(s): I48.0 - Paroxysmal 

atrial fibrillation   


Plan to address problem: 


[facility]








(4) DVT prophylaxis


Current Visit: Yes   Status: Acute

## 2019-03-30 NOTE — CONSULTATION
History of Present Illness


Consult date: 03/30/19


Requesting physician: SHANE ALCARAZ


Reason for consult: pulmonary embolism


History of present illness: 








PULMONARY/CCM CONSULT NOTE (Full dictation # [id number])





Please see dictated notes for full details





Medications and Allergies


                                    Allergies











Allergy/AdvReac Type Severity Reaction Status Date / Time


 


No Known Allergies Allergy   Verified 03/29/19 23:19











                                Home Medications











 Medication  Instructions  Recorded  Confirmed  Last Taken  Type


 


Apixaban [Eliquis] 5 mg PO DAILY 03/29/19 03/29/19 Unknown History











Active Meds: 


Active Medications





Acetaminophen (Tylenol)  650 mg PO Q4H PRN


   PRN Reason: Pain MILD(1-3)/Fever >100.5/HA


Apixaban (Eliquis)  5 mg PO DAILY [facility]; Protocol


   Last Admin: 03/30/19 09:15 Dose:  5 mg


   Documented by: 


Enoxaparin Sodium (Lovenox)  60 mg SUB-Q BID GERARDO


   Last Admin: 03/30/19 09:14 Dose:  60 mg


   Documented by: 


Hydromorphone HCl (Dilaudid)  0.25 mg IV Q3H PRN


   PRN Reason: Pain, Moderate (4-6)


Sodium Chloride (Nacl 0.9% 1000 Ml)  1,000 mls @ 75 mls/hr IV AS DIRECT GERARDO


   Last Admin: 03/29/19 23:54 Dose:  75 mls/hr


   Documented by: 


Ceftriaxone Sodium (Rocephin/Ns 1 Gm/50 Ml)  1 gm in 50 mls @ 100 mls/hr IV 

Q24HR [facility]; Protocol


   Last Admin: 03/30/19 09:56 Dose:  100 mls/hr


   Documented by: 


Ondansetron HCl (Zofran)  4 mg IV Q8H PRN


   PRN Reason: Nausea And Vomiting


   Last Admin: 03/30/19 09:32 Dose:  4 mg


   Documented by: 


Oxycodone/Acetaminophen (Percocet 5/325)  1 tab PO Q6H PRN


   PRN Reason: Pain, Moderate (4-6)


   Last Admin: 03/30/19 09:32 Dose:  1 tab


   Documented by: 


Potassium Chloride (Potassium Chloride)  40 meq FEEDTUBE Q6H GERARDO


   Stop: 03/30/19 17:01


Sodium Chloride (Sodium Chloride Flush Syringe 10 Ml)  10 ml IV BID GERARDO


   Last Admin: 03/30/19 09:38 Dose:  10 ml


   Documented by: 


Sodium Chloride (Sodium Chloride Flush Syringe 10 Ml)  10 ml IV PRN PRN


   PRN Reason: LINE FLUSH











Physical Examination


Vital signs: 


                                   Vital Signs











Resp


 


 16 


 


 03/29/19 14:51














Results





- Laboratory Findings


CBC and BMP: 


                                 03/30/19 12:54





                                 03/30/19 05:38


PT/INR, D-dimer











PT  13.6 Sec. (12.2-14.9)   03/29/19  15:43    


 


INR  0.98  (0.87-1.13)   03/29/19  15:43    


 


D-Dimer  1149.82 ng/mlDDU (0-234)  H  03/29/19  15:43    








Abnormal lab findings: 


                                  Abnormal Labs











  03/29/19 03/29/19 03/29/19





  15:43 15:43 15:43


 


WBC   3.1 L 


 


RDW   16.2 H 


 


Monocytes % (Manual)   11.0 H 


 


Seg Neutrophils # Man   1.7 L 


 


Lymphocytes # (Manual)   1.1 L 


 


D-Dimer  1149.82 H  


 


Potassium   


 


Chloride    97.4 L


 


BUN   


 


Creatinine    0.5 L


 


Glucose    103 H


 


ALT   


 


Total Protein   


 


Albumin   


 


Urine WBC (Auto)   














  03/29/19 03/30/19





  19:00 05:38


 


WBC  


 


RDW  


 


Monocytes % (Manual)  


 


Seg Neutrophils # Man  


 


Lymphocytes # (Manual)  


 


D-Dimer  


 


Potassium   3.0 L


 


Chloride  


 


BUN   5 L


 


Creatinine   0.4 L


 


Glucose  


 


ALT   6 L


 


Total Protein   5.6 L


 


Albumin   2.9 L


 


Urine WBC (Auto)  27.0 H

## 2019-03-31 NOTE — CONSULTATION
PULMONARY CONSULTATION NOTE



This is a coverage for Dr. Rishi Jacob.



CONSULTING PHYSICIAN:  Kb Fermin MD



REASON FOR CONSULTATION:  Pulmonary embolism.



CHIEF COMPLAINT AND HISTORY OF PRESENT ILLNESS:  As follows, the patient is an

87-year-old -American female with past medical history significant

amongst other things in this context for a diagnosis of atrial fibrillation and

a prior diagnosis of venous thromboembolic phenomenon for which she does not

seem like she was on any anticoagulation, came into the Emergency Room

complaining of dizziness and shortness of breath.  She reportedly was at an

outpatient imaging center about to undergo a brain MRI when she developed her

symptoms.  She had denied headache, denied chest pains, vomiting, fevers. 

Denied any constitutional symptoms.  She was evaluated into the Emergency Room

and CT angio of the chest that was obtained did show subsegmental filling

defects consistent with pulmonary emboli without overt central large emboli. 

She was admitted to the medical floor, started on full-dose anticoagulation.  We

are asked to assist with the management.  When I stopped by to see her, she was

lying in bed.  She certainly does have an element of dementia, unable to give me

much of history.  I am assuming that she has a relatively sedentary lifestyle. 

I do not have any history of bleeding since she has been admitted to the

hospital.  She seemed to deny any chest pains when I saw her.  This really is as

much of the history of presentation as I have.



PAST MEDICAL HISTORY:  Again, significant amongst other things for a diagnosis

of prior venous thromboembolic phenomenon, also a history of atrial

fibrillation.  History of dementia and history of prior cerebrovascular

accident.



PAST SURGICAL HISTORY:  Unknown.



MEDICATIONS:  She was on at the time I stopped by to see were reviewed. 

Pertinent medications included the following:  She was on Tylenol 650 mg p.o. q.

4 hours p.r.n. mild pain or fevers.  Eliquis 5 mg p.o. daily had just been

started.  She was still on the Lovenox 60 mg subq b.i.d., Dilaudid 0.25 mg IV q.

3 hours p.r.n. moderate to severe pain, Rocephin 1 gram IV daily, Zofran 4 mg IV

q. 8 hours p.r.n. nausea and vomiting, p.r.n. Percocet.  She received potassium

chloride 40 mEq via feeding tube.  It was scheduled q. 6 hours, I believe there

is a stop date.



ALLERGIES:  No known drug allergies.



DIET:  Petite thin looking elderly female.  She denies significant weight loss

or gain in the preceding few weeks to months, although she does have a history

of dementia.



FAMILY AND SOCIAL HISTORY:  As far as I can tell, she is a never smoker by the

history.  It is unclear if she came from home or from the nursing home.



REVIEW OF SYSTEMS:  Difficult to obtain secondary to the patient's medical and

mental condition.  Since she has been in the hospital, no gross hematochezia or

melena, no gross hematuria, no hematemesis, no hemoptysis, no witnessed

seizures.  Review of systems unfortunately, otherwise unobtainable.



PHYSICAL EXAMINATION:

VITAL SIGNS:  At presentation in the Emergency Room, she was afebrile,

temperature 98.0 degrees Fahrenheit with a pulse of 95, respiratory rate of 21,

blood pressure 142/91, O2 sats were 98%, inspired oxygen concentration at that

time was not recorded.  When I stopped by to see her, O2 sats were 99% and that

was on room air.

GENERAL:  She is elderly looking -American female, normocephalic,

atraumatic, talking to me in full sentences without overt respiratory distress.

HEAD, EYES, EARS, NOSE AND THROAT:  She is anicteric.  No conjunctival erythema.

 Oropharynx is moist.  It is a Mallampati #2 oropharynx.  No gross jugular

venous distention, no thyromegaly.  Grossly, no palpable lymph nodes in the

supraclavicular or submandibular lymph node chains.

LUNGS:  Auscultation of both lung fields do show diminished bilateral air

movement, without wheezing or rales.

HEART:  Heart sounds 1 and 2 are heard.  They were regular in rate and rhythm at

the time of my evaluation, without rubs or murmurs.

ABDOMEN:  Soft, flat.  Bowel sounds are positive, nontender.  No palpable

hepatosplenomegaly.

EXTREMITIES:  Without overt digital clubbing, no cyanosis, no significant pedal

edema.  Dorsalis pedis pulses are palpable bilaterally.

NEUROLOGIC:  Left pupil is about 3 mm, sluggishly reactive to light.  Right

pupil is irregular, likely postoperative.  Extraocular muscle movements are

intact.  She moves all 4 extremities spontaneously.  She does have the dementia

and is not oriented to place.  The skin is of normal turgor without overt

cellulitis or rash.



LABORATORY DATA:  From my review are as follows:  Admission white cell count

3,100, hemoglobin 13.0, hematocrit 38.3 and platelet count 219.  No band

neutrophils reported.  INR was within normal limits.  D-dimer was elevated at

1150.  Serum sodium was 142, potassium 3.6, chloride 97, bicarbonate 25, BUN 8,

creatinine 0.5, glucose 103.  Troponins within normal limits.  Urinalysis,

moderate leukocyte esterase and 27 white cells per high power field.  No urine

cultures.  I have reviewed the chest x-ray, essentially clear.  The CT angiogram

of the chest reveals particularly in the right lower lobe subsegmental branches,

filling defects consistent with pulmonary emboli.  A CT scan of the head was

also done and it showed no evidence of an acute intracranial process.  Bilateral

lower extremity Dopplers were also done, no evidence of DVT.



ASSESSMENT AND PLAN:

1.  Acute venous thromboembolic phenomena with subsegmental pulmonary emboli.

2.  Dizziness on presentation and shortness of breath.

3.  History of cerebrovascular accident.

4.  History of prior venous thromboembolic phenomenon.

5.  History of an arrhythmia, paroxysmal atrial fibrillation.

6.  Leukopenia, etiology unclear.

7.  Urinary tract infection, possible.

8.  Elevated D-dimer.

9.  Hypokalemia.  Serum potassium of 3.0 today.

10.  Adult failure to thrive.



PLAN:  We will continue full anticoagulation while watching her closely for any

signs and symptoms of bleeding.  It is really important as to figure out why she

was not on treatment for a prior venous thromboembolic event, certainly fall

precautions will need to be implemented.  It is unclear if she stays at home or

in a nursing home, but this should need to be watched clearly and family would

need to be educated about signs and symptoms of bleeding.  Supplemental oxygen

will be given as necessary to keep O2 sats greater than or equal to over 90%. 

Aspiration precautions should be maintained.  If there has been a major weight

loss or major deterioration in her health malignancy screening may be ordered. 

Hematology/Oncology evaluation will also be appropriate.  I will send urine

cultures.  She is on empiric treatment for urinary tract infection with

Rocephin.  Especially with her on full anticoagulation, I will also begin GI

prophylaxis with Protonix.  Flu and pneumonia vaccination will be addressed per

protocol.



Thank you very much for the consult.  We will follow along.  We will make

further recommendations as picture progresses/becomes clearer.





DD: 03/31/2019 11:38

DT: 03/31/2019 19:24

[facility]# [id number]  [id number]

AJM/JUANITA

## 2019-03-31 NOTE — PROGRESS NOTE
Assessment and Plan








Acute venous thromboembolic phenomena with subsegmental pulmonary emboli.


Dizziness on presentation and shortness of breath.


History of cerebrovascular accident.


History of prior venous thromboembolic phenomenon.


History of an arrhythmia, paroxysmal atrial fibrillation.


Leukopenia, etiology unclear.


Urinary tract infection, possible.


Elevated D-dimer.


Hypokalemia


Adult failure to thrive





- continue full anticoagulation


- monitor H&H acutely


- supplemental oxygen to keep sat's > 90%


- fall precautions


- nutritionist & dietician to optimize dietary intake


- GI prophylaxis


- mobility protocol for pressure ulcer prophylaxis


- PT/OT


- continue other care per attending / other consultants





... re-evaluate in am & prn





Subjective


Date of service: 03/31/19


Principal diagnosis: Acute VTE; Dizziness; SOB; H/O CVA; Paroxysmal A-Fib


Interval history: 





Patient is seen today for: Acute venous thromboembolic phenomena with 

subsegmental pulmonary emboli; Dizziness on presentation and shortness of 

breath; History of cerebrovascular accident; History of prior venous 

thromboembolic phenomenon; History of an arrhythmia, paroxysmal atrial 

fibrillation.





Seen and examined at bedside; 24hour events reviewed; nursing and respiratory 

care staff consulted; no adverse overnight events reported to me; resting 

peacefully in bed; no bleeding reported; old records pending; no emesis or overt

aspiration








Objective


                               Vital Signs - 12hr











  03/31/19 03/31/19 03/31/19





  04:34 08:10 10:00


 


Temperature 98.2 F 97.8 F 


 


Pulse Rate  78 96 H


 


Pulse Rate [   





Right Radial]   


 


Respiratory 18 18 





Rate   


 


Blood Pressure 144/87 158/106 


 


Blood Pressure   





[Left]   


 


O2 Sat by Pulse  92 





Oximetry   














  03/31/19 03/31/19





  12:00 14:54


 


Temperature  98.1 F


 


Pulse Rate  93 H


 


Pulse Rate [ 78 





Right Radial]  


 


Respiratory 18 18





Rate  


 


Blood Pressure  


 


Blood Pressure  127/95





[Left]  


 


O2 Sat by Pulse 92 99





Oximetry  











Constitutional: no acute distress, appears uncomfortable (elderly looking female

 resting in bed with mildly increased resp effort at rest)


Eyes: non-icteric


ENT: oropharynx moist, other (mallampati 2)


Neck: supple, no lymphadenopathy, no JVD


Effort: mildly labored


Ascultation: Bilateral: diminished breath sounds, rhonchi


Percussion: Bilateral: not dull


Cardiovascular: irregular rhythm


Gastrointestinal: normoactive bowel sounds, soft, non-tender, non-distended


Integumentary: normal


Extremities: no cyanosis, pulses normal, no ischemia or petechiae


Neurologic: normal mental status, non-focal exam (grossly), pupils equal and 

round, CN II-XII normal


Psychiatric: other (confused / demented)


CBC and BMP: 


                                 04/02/19 06:19





                                 04/02/19 06:19


ABG, PT/INR, D-dimer: 


PT/INR, D-dimer











PT  13.6 Sec. (12.2-14.9)   03/29/19  15:43    


 


INR  0.98  (0.87-1.13)   03/29/19  15:43    


 


D-Dimer  1149.82 ng/mlDDU (0-234)  H  03/29/19  15:43    








Abnormal lab findings: 


                                  Abnormal Labs











  03/29/19 03/29/19 03/29/19





  15:43 15:43 15:43


 


WBC   3.1 L 


 


RDW   16.2 H 


 


Mono % (Auto)   


 


Lymph #   


 


Monocytes % (Manual)   11.0 H 


 


Seg Neutrophils #   


 


Seg Neutrophils # Man   1.7 L 


 


Lymphocytes # (Manual)   1.1 L 


 


D-Dimer  1149.82 H  


 


Potassium   


 


Chloride    97.4 L


 


BUN   


 


Creatinine    0.5 L


 


Glucose    103 H


 


ALT   


 


Total Protein   


 


Albumin   


 


Urine WBC (Auto)   














  03/29/19 03/30/19 03/30/19





  19:00 05:38 12:54


 


WBC    2.5 L


 


RDW    16.4 H


 


Mono % (Auto)    16.0 H


 


Lymph #    0.9 L


 


Monocytes % (Manual)   


 


Seg Neutrophils #    1.2 L


 


Seg Neutrophils # Man   


 


Lymphocytes # (Manual)   


 


D-Dimer   


 


Potassium   3.0 L 


 


Chloride   


 


BUN   5 L 


 


Creatinine   0.4 L 


 


Glucose   


 


ALT   6 L 


 


Total Protein   5.6 L 


 


Albumin   2.9 L 


 


Urine WBC (Auto)  27.0 H  














  03/31/19





  15:17


 


WBC  3.5 L


 


RDW  16.5 H


 


Mono % (Auto) 


 


Lymph # 


 


Monocytes % (Manual) 


 


Seg Neutrophils # 


 


Seg Neutrophils # Man 


 


Lymphocytes # (Manual) 


 


D-Dimer 


 


Potassium 


 


Chloride 


 


BUN 


 


Creatinine 


 


Glucose 


 


ALT 


 


Total Protein 


 


Albumin 


 


Urine WBC (Auto) 











Chest x-ray: image reviewed


Allied health notes reviewed: nursing

## 2019-03-31 NOTE — PROGRESS NOTE
Assessment and Plan


Assessment and plan: 


Acute resp failure due to bilateral pulmonary embolism


Supplemental Oxygen


Pulmonology following





Pulmonary embolism


Started on Lovenox 1mg/kg bid


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

at home


Daughter at bedside says patient was just diagnosed with pulm embolism Jan or 

Feb, 1-2 minths ago


Pulm embolsm





History of Pulmonary embolism





History of DVT, details unavailable





Atrial fibrillation





UTI


Started on Ceftriaxone





Hypokalemia.


replace and recheck





Poss Dementia.


daughter states patient was seeing Dr. Gilmar Desouza, neurology was undergoing 

evaluation for confusion,agitation to rule out dementia. She requests seeing 

neurologist here.





FULL CODE STATUS.








History


Interval history: 


Shortness of breath


Chest pain


Agitation on and off


Confusion on and off


Daughter at bedside states she had PE in Jan or Feb, that is 1-2 months ago.


Also daughter states she was being seen by Neurologist to evaluate for poss 

dementia








Hospitalist Physical





- Physical exam


Narrative exam: 


GEN: Not in acute distress, lying in bed, 


HEENT: Normocephalic, atraumatic, 


Neck: supple, No JVD


heart: S1 and S2 reg, no murmurs, rubs or gallop


Lungs: clear to auscultation bilateral, no crackles, no wheeze 


Abd:soft, non tender, non distended, normal bowel sounds


Ext:No edema, no clubbing


Neuro:Awake,alert, confused , agitated on and off, no focal signs, moves all ext


Psych: normal mood











- Constitutional


Vitals: 


                                        











Temp Pulse Resp BP Pulse Ox


 


 98.1 F   93 H  18   127/95   99 


 


 03/31/19 14:54  03/31/19 14:54  03/31/19 14:54  03/31/19 14:54  03/31/19 14:54














Results





- Labs


CBC & Chem 7: 


                                 03/31/19 15:17





                                 03/31/19 15:17


Labs: 


                             Laboratory Last Values











WBC  2.5 K/mm3 (4.5-11.0)  L  03/30/19  12:54    


 


RBC  4.09 M/mm3 (3.65-5.03)   03/30/19  12:54    


 


Hgb  11.5 gm/dl (10.1-14.3)   03/30/19  12:54    


 


Hct  35.1 % (30.3-42.9)   03/30/19  12:54    


 


MCV  86 fl (79-97)   03/30/19  12:54    


 


MCH  28 pg (28-32)   03/30/19  12:54    


 


MCHC  33 % (30-34)   03/30/19  12:54    


 


RDW  16.4 % (13.2-15.2)  H  03/30/19  12:54    


 


Plt Count  229 K/mm3 (140-440)   03/30/19  12:54    


 


Lymph % (Auto)  33.5 % (13.4-35.0)   03/30/19  12:54    


 


Mono % (Auto)  16.0 % (0.0-7.3)  H  03/30/19  12:54    


 


Eos % (Auto)  0.6 % (0.0-4.3)   03/30/19  12:54    


 


Baso % (Auto)  0.8 % (0.0-1.8)   03/30/19  12:54    


 


Lymph #  0.9 K/mm3 (1.2-5.4)  L  03/30/19  12:54    


 


Mono #  0.4 K/mm3 (0.0-0.8)   03/30/19  12:54    


 


Eos #  0.0 K/mm3 (0.0-0.4)   03/30/19  12:54    


 


Baso #  0.0 K/mm3 (0.0-0.1)   03/30/19  12:54    


 


Add Manual Diff  Complete   03/30/19  12:54    


 


Total Counted  100   03/29/19  15:43    


 


Seg Neutrophils %  49.1 % (40.0-70.0)   03/30/19  12:54    


 


Seg Neuts % (Manual)  54.0 % (40.0-70.0)   03/29/19  15:43    


 


Band Neutrophils %  0 %  03/29/19  15:43    


 


Lymphocytes % (Manual)  35.0 % (13.4-35.0)   03/29/19  15:43    


 


Reactive Lymphs % (Man)  0 %  03/29/19  15:43    


 


Monocytes % (Manual)  11.0 % (0.0-7.3)  H  03/29/19  15:43    


 


Eosinophils % (Manual)  0 % (0.0-4.3)   03/29/19  15:43    


 


Basophils % (Manual)  0 % (0.0-1.8)   03/29/19  15:43    


 


Metamyelocytes %  0 %  03/29/19  15:43    


 


Myelocytes %  0 %  03/29/19  15:43    


 


Promyelocytes %  0 %  03/29/19  15:43    


 


Blast Cells %  0 %  03/29/19  15:43    


 


Nucleated RBC %  Not Reportable   03/29/19  15:43    


 


Seg Neutrophils #  1.2 K/mm3 (1.8-7.7)  L  03/30/19  12:54    


 


Seg Neutrophils # Man  1.7 K/mm3 (1.8-7.7)  L  03/29/19  15:43    


 


Band Neutrophils #  0.0 K/mm3  03/29/19  15:43    


 


Lymphocytes # (Manual)  1.1 K/mm3 (1.2-5.4)  L  03/29/19  15:43    


 


Abs React Lymphs (Man)  0.0 K/mm3  03/29/19  15:43    


 


Monocytes # (Manual)  0.3 K/mm3 (0.0-0.8)   03/29/19  15:43    


 


Eosinophils # (Manual)  0.0 K/mm3 (0.0-0.4)   03/29/19  15:43    


 


Basophils # (Manual)  0.0 K/mm3 (0.0-0.1)   03/29/19  15:43    


 


Metamyelocytes #  0.0 K/mm3  03/29/19  15:43    


 


Myelocytes #  0.0 K/mm3  03/29/19  15:43    


 


Promyelocytes #  0.0 K/mm3  03/29/19  15:43    


 


Blast Cells #  0.0 K/mm3  03/29/19  15:43    


 


WBC Morphology  Not Reportable   03/29/19  15:43    


 


Hypersegmented Neuts  Not Reportable   03/29/19  15:43    


 


Hyposegmented Neuts  Not Reportable   03/29/19  15:43    


 


Hypogranular Neuts  Not Reportable   03/29/19  15:43    


 


Smudge Cells  Not Reportable   03/29/19  15:43    


 


Toxic Granulation  Not Reportable   03/29/19  15:43    


 


Toxic Vacuolation  Not Reportable   03/29/19  15:43    


 


Dohle Bodies  Not Reportable   03/29/19  15:43    


 


Pelger-Huet Anomaly  Not Reportable   03/29/19  15:43    


 


Gabino Rods  Not Reportable   03/29/19  15:43    


 


Platelet Estimate  Consistent w auto   03/29/19  15:43    


 


Clumped Platelets  Not Reportable   03/29/19  15:43    


 


Plt Clumps, EDTA  Not Reportable   03/29/19  15:43    


 


Large Platelets  Not Reportable   03/29/19  15:43    


 


Giant Platelets  Not Reportable   03/29/19  15:43    


 


Platelet Satelliting  Not Reportable   03/29/19  15:43    


 


Plt Morphology Comment  Not Reportable   03/29/19  15:43    


 


RBC Morphology  Not Reportable   03/29/19  15:43    


 


Dimorphic RBCs  Not Reportable   03/29/19  15:43    


 


Polychromasia  Not Reportable   03/29/19  15:43    


 


Hypochromasia  Not Reportable   03/29/19  15:43    


 


Poikilocytosis  1+   03/29/19  15:43    


 


Anisocytosis  1+   03/29/19  15:43    


 


Microcytosis  Not Reportable   03/29/19  15:43    


 


Macrocytosis  Not Reportable   03/29/19  15:43    


 


Spherocytes  Not Reportable   03/29/19  15:43    


 


Pappenheimer Bodies  Not Reportable   03/29/19  15:43    


 


Sickle Cells  Not Reportable   03/29/19  15:43    


 


Target Cells  Not Reportable   03/29/19  15:43    


 


Tear Drop Cells  Not Reportable   03/29/19  15:43    


 


Ovalocytes  Few   03/29/19  15:43    


 


Helmet Cells  Not Reportable   03/29/19  15:43    


 


[place] Bodies  Not Reportable   03/29/19  15:43    


 


Cabot Rings  Not Reportable   03/29/19  15:43    


 


Corinth Cells  Not Reportable   03/29/19  15:43    


 


Bite Cells  Not Reportable   03/29/19  15:43    


 


Crenated Cell  Not Reportable   03/29/19  15:43    


 


Elliptocytes  Few   03/29/19  15:43    


 


Acanthocytes (Spur)  Not Reportable   03/29/19  15:43    


 


Rouleaux  Not Reportable   03/29/19  15:43    


 


Hemoglobin C Crystals  Not Reportable   03/29/19  15:43    


 


Schistocytes  Not Reportable   03/29/19  15:43    


 


Malaria parasites  Not Reportable   03/29/19  15:43    


 


Michael Bodies  Not Reportable   03/29/19  15:43    


 


Hem Pathologist Commnt  No   03/29/19  15:43    


 


PT  13.6 Sec. (12.2-14.9)   03/29/19  15:43    


 


INR  0.98  (0.87-1.13)   03/29/19  15:43    


 


APTT  25.1 Sec. (24.2-36.6)   03/29/19  15:43    


 


D-Dimer  1149.82 ng/mlDDU (0-234)  H  03/29/19  15:43    


 


Sodium  142 mmol/L (137-145)   03/30/19  05:38    


 


Potassium  3.0 mmol/L (3.6-5.0)  L  03/30/19  05:38    


 


Chloride  100.7 mmol/L ()   03/30/19  05:38    


 


Carbon Dioxide  23 mmol/L (22-30)   03/30/19  05:38    


 


Anion Gap  21 mmol/L  03/30/19  05:38    


 


BUN  5 mg/dL (7-17)  L  03/30/19  05:38    


 


Creatinine  0.4 mg/dL (0.7-1.2)  L  03/30/19  05:38    


 


Estimated GFR  > 60 ml/min  03/30/19  05:38    


 


BUN/Creatinine Ratio  13 %  03/30/19  05:38    


 


Glucose  83 mg/dL ()   03/30/19  05:38    


 


Hemoglobin A1c  4.8 % (4-6)   03/30/19  00:27    


 


Calcium  8.8 mg/dL (8.4-10.2)   03/30/19  05:38    


 


Total Bilirubin  0.70 mg/dL (0.1-1.2)   03/30/19  05:38    


 


AST  21 units/L (5-40)   03/30/19  05:38    


 


ALT  6 units/L (7-56)  L  03/30/19  05:38    


 


Alkaline Phosphatase  39 units/L ()   03/30/19  05:38    


 


Troponin T  < 0.010 ng/mL (0.00-0.029)   03/29/19  21:19    


 


Total Protein  5.6 g/dL (6.3-8.2)  L  03/30/19  05:38    


 


Albumin  2.9 g/dL (3.9-5)  L  03/30/19  05:38    


 


Albumin/Globulin Ratio  1.1 %  03/30/19  05:38    


 


Urine Color  Yellow  (Yellow)   03/29/19  19:00    


 


Urine Turbidity  Clear  (Clear)   03/29/19  19:00    


 


Urine pH  6.0  (5.0-7.0)   03/29/19  19:00    


 


Urine Protein  <15 mg/dl mg/dL (Negative)   03/29/19  19:00    


 


Urine Glucose (UA)  Neg mg/dL (Negative)   03/29/19  19:00    


 


Urine Ketones  80 mg/dL (Negative)   03/29/19  19:00    


 


Urine Blood  Neg  (Negative)   03/29/19  19:00    


 


Urine Nitrite  Neg  (Negative)   03/29/19  19:00    


 


Urine Bilirubin  Neg  (Negative)   03/29/19  19:00    


 


Urine Urobilinogen  4.0 mg/dL (<2.0)   03/29/19  19:00    


 


Ur Leukocyte Esterase  Mod  (Negative)   03/29/19  19:00    


 


Urine WBC (Auto)  27.0 /HPF (0.0-6.0)  H  03/29/19  19:00    


 


Urine RBC (Auto)  5.0 /HPF (0.0-6.0)   03/29/19  19:00    


 


U Epithel Cells (Auto)  1.0 /HPF (0-13.0)   03/29/19  19:00    


 


Urine Mucus  2+ /HPF  03/29/19  19:00    














Active Medications





- Current Medications


Current Medications: 














Generic Name Dose Route Start Last Admin





  Trade Name Freq  PRN Reason Stop Dose Admin


 


Acetaminophen  650 mg  03/29/19 23:26 





  Tylenol  PO  





  Q4H PRN  





  Pain MILD(1-3)/Fever >100.5/HA  


 


Enoxaparin Sodium  50 mg  03/30/19 22:00  03/31/19 10:21





  Lovenox  SUB-Q   50 mg





  BID GERARDO   Administration


 


Hydromorphone HCl  0.25 mg  03/29/19 23:26 





  Dilaudid  IV  





  Q3H PRN  





  Pain, Moderate (4-6)  


 


Sodium Chloride  1,000 mls @ 75 mls/hr  03/29/19 23:45  03/30/19 12:51





  Nacl 0.9% 1000 Ml  IV   75 mls/hr





  AS DIRECT GERARDO   Administration


 


Ceftriaxone Sodium  1 gm in 50 mls @ 100 mls/hr  03/30/19 10:00  03/31/19 10:21





  Rocephin/Ns 1 Gm/50 Ml  IV   100 mls/hr





  Q24HR GERARDO   Administration





  Protocol  


 


Ondansetron HCl  4 mg  03/29/19 23:26  03/30/19 09:32





  Zofran  IV   4 mg





  Q8H PRN   Administration





  Nausea And Vomiting  


 


Oxycodone/Acetaminophen  1 tab  03/29/19 23:26  03/30/19 09:32





  Percocet 5/325  PO   1 tab





  Q6H PRN   Administration





  Pain, Moderate (4-6)  


 


Polyethylene Glycol  17 gm  03/30/19 18:03  03/30/19 18:25





  Miralax 3350  PO   17 gm





  QDAY PRN   Administration





  Constipation  


 


Sodium Chloride  10 ml  03/30/19 10:00  03/31/19 10:21





  Sodium Chloride Flush Syringe 10 Ml  IV   10 ml





  BID GERARDO   Administration


 


Sodium Chloride  10 ml  03/29/19 23:26 





  Sodium Chloride Flush Syringe 10 Ml  IV  





  PRN PRN  





  LINE FLUSH  














Nutrition/Malnutrition Assess





- Dietary Evaluation


Nutrition/Malnutrition Findings: 


                                        





Nutrition Notes                                            Start:  03/30/19 

14:12


Freq:                                                      Status: Active       




Protocol:                                                                       




 Document     03/31/19 12:28  OL  (Rec: 03/31/19 12:42  OL  [id number])


 Nutrition Notes


     Initial or Follow up                        Assessment


     Current Diagnosis                           Stroke


     Other Pertinent Diagnosis                   dementia, acute pulmonary


                                                 embolism


     Current Diet                                regular


     Labs/Tests                                  Reviewed


     Pertinent Medications                       Reviewed


     Height                                      5 ft


     Weight                                      45 kg


     Ideal Body Weight (kg)                      45.45


     BMI                                         19.3


     Subjective/Other Information                Pt. pleasantly confused. Pt.


                                                 thin but states she is eating


                                                 well. Unsure of PO intake


                                                 prior to admission. Pt.


                                                 amenable to ONS.


     #1


      Nutrition Diagnosis                        Predicted suboptimal energy


                                                 intake


      Etiology                                   AMS


      As Evidenced by Signs and Symptoms         low BMI for age


     Is patient on ventilator?                   No


     Is Patient Ambulatory and/or Out of Bed     No


     REE-([facility]-confined to bed)      [phone]


     Kcal/Kg value to use for calculation        30


     Approximate Energy Requirements Using       1350


      kcal/Kg                                    


     Additional Notes                            protein (1.2-1.5g/kg): 54-68g


                                                 fluid: 1mL/kcal or per MD


 Nutrition Intervention


     Change Diet Order:                          Continue regular


     Add Supplement/Snack (indicate name/kcal    Ensure Enlive BID


      /protein )                                 


     Provides kCal:                              700


     Provides Protein (gm)                       40


     Goal #1                                     Diet to meet % of


                                                 nutrient needs


     Anticipated Discharge Needs:                Regular


     Follow-Up By:                               04/02/19


     Additional Comments                         f/u : intakes

## 2019-04-01 NOTE — PROGRESS NOTE
Assessment and Plan





Patient is weak, confused.No acute respiratory distress. On 2 litres o2. O2 

saturation 96%.Patient diagnosed with pulmonary embolism.Patient is on S/C 

Lovenox 50 mg S/C BID





- Patient Problems


(1) Acute pulmonary embolism


Current Visit: Yes   Status: Acute   


Qualifiers: 


   Acute cor pulmonale presence: without acute cor pulmonale 


Plan to address problem: 


Patient is on S/C Lovenox.


 Patient is on O2 supplementation.








(2) UTI (urinary tract infection)


Current Visit: Yes   Status: Acute   


Qualifiers: 


   Urinary tract infection type: acute cystitis 


Plan to address problem: 


Patient is on ceftrioxone.








(3) A-fib


Current Visit: Yes   Status: Chronic   


Qualifiers: 


   Atrial fibrillation type: paroxysmal   Qualified Code(s): I48.0 - Paroxysmal 

atrial fibrillation   


Plan to address problem: 


Patient is on S/C Lovenox.








Subjective


Date of service: 04/01/19


Interval history: 





Patient is weak, confused.No acute respiratory distress. On 2 litres o2. O2 

saturation 96%.Patient diagnosed with pulmonary embolism.Patient is on S/C 

Lovenox 50 mg S/C BID.





Objective


Constitutional: alert, appears uncomfortable, other (Weak, confused.)


Eyes: non-icteric


ENT: oropharynx moist


Neck: supple, no lymphadenopathy


Ascultation: Bilateral: diminished breath sounds


Cardiovascular: regular rate and rhythm


Gastrointestinal: normoactive bowel sounds, soft, non-tender


Integumentary: normal


Extremities: no cyanosis, no edema


Neurologic: non-focal exam, pupils equal and round, other (Confused.)


Psychiatric: depressed


CBC and BMP: 


                                 03/31/19 15:17





                                 03/31/19 15:17


ABG, PT/INR, D-dimer: 


PT/INR, D-dimer











PT  13.6 Sec. (12.2-14.9)   03/29/19  15:43    


 


INR  0.98  (0.87-1.13)   03/29/19  15:43    


 


D-Dimer  1149.82 ng/mlDDU (0-234)  H  03/29/19  15:43    








Abnormal lab findings: 


                                  Abnormal Labs











  03/29/19 03/29/19 03/29/19





  15:43 15:43 15:43


 


WBC   3.1 L 


 


RDW   16.2 H 


 


Mono % (Auto)   


 


Lymph #   


 


Monocytes % (Manual)   11.0 H 


 


Seg Neutrophils #   


 


Seg Neutrophils # Man   1.7 L 


 


Lymphocytes # (Manual)   1.1 L 


 


D-Dimer  1149.82 H  


 


Potassium   


 


Chloride    97.4 L


 


BUN   


 


Creatinine    0.5 L


 


Glucose    103 H


 


ALT   


 


Total Protein   


 


Albumin   


 


Cholesterol   


 


LDL Cholesterol Direct   


 


Urine WBC (Auto)   














  03/29/19 03/30/19 03/30/19





  19:00 05:38 12:54


 


WBC    2.5 L


 


RDW    16.4 H


 


Mono % (Auto)    16.0 H


 


Lymph #    0.9 L


 


Monocytes % (Manual)   


 


Seg Neutrophils #    1.2 L


 


Seg Neutrophils # Man   


 


Lymphocytes # (Manual)   


 


D-Dimer   


 


Potassium   3.0 L 


 


Chloride   


 


BUN   5 L 


 


Creatinine   0.4 L 


 


Glucose   


 


ALT   6 L 


 


Total Protein   5.6 L 


 


Albumin   2.9 L 


 


Cholesterol   


 


LDL Cholesterol Direct   


 


Urine WBC (Auto)  27.0 H  














  03/31/19 03/31/19 04/01/19





  15:17 15:17 14:58


 


WBC  3.5 L  


 


RDW  16.5 H  


 


Mono % (Auto)   


 


Lymph #   


 


Monocytes % (Manual)   


 


Seg Neutrophils #   


 


Seg Neutrophils # Man   


 


Lymphocytes # (Manual)   


 


D-Dimer   


 


Potassium   3.2 L 


 


Chloride   


 


BUN   3 L 


 


Creatinine   0.6 L 


 


Glucose   


 


ALT   


 


Total Protein   


 


Albumin   


 


Cholesterol    216 H


 


LDL Cholesterol Direct    159 H


 


Urine WBC (Auto)   











Additional Studies: 





Angio CT of chest done on 3/29/19





IMPRESSION: 


1. Evidence of pulmonary artery emboli. 


2. Cardiomegaly with small pericardial fluid collection. 


3. Aneurysmal dilatation of ascending thoracic aorta (4.4 cm).

## 2019-04-01 NOTE — PROGRESS NOTE
Hospitalist Physical





- Constitutional


Vitals: 


                                        











Temp Pulse Resp BP Pulse Ox


 


 98.0 F   73   18   114/76   96 


 


 04/01/19 04:00  04/01/19 04:00  04/01/19 04:00  04/01/19 04:00  04/01/19 04:00











General appearance: Present: mild distress, well-nourished





Results





- Labs


CBC & Chem 7: 


                                 03/31/19 15:17





                                 03/31/19 15:17


Labs: 


                             Laboratory Last Values











WBC  3.5 K/mm3 (4.5-11.0)  L  03/31/19  15:17    


 


RBC  3.87 M/mm3 (3.65-5.03)   03/31/19  15:17    


 


Hgb  11.1 gm/dl (10.1-14.3)   03/31/19  15:17    


 


Hct  33.5 % (30.3-42.9)   03/31/19  15:17    


 


MCV  87 fl (79-97)   03/31/19  15:17    


 


MCH  29 pg (28-32)   03/31/19  15:17    


 


MCHC  33 % (30-34)   03/31/19  15:17    


 


RDW  16.5 % (13.2-15.2)  H  03/31/19  15:17    


 


Plt Count  221 K/mm3 (140-440)   03/31/19  15:17    


 


Lymph % (Auto)  33.5 % (13.4-35.0)   03/30/19  12:54    


 


Mono % (Auto)  16.0 % (0.0-7.3)  H  03/30/19  12:54    


 


Eos % (Auto)  0.6 % (0.0-4.3)   03/30/19  12:54    


 


Baso % (Auto)  0.8 % (0.0-1.8)   03/30/19  12:54    


 


Lymph #  0.9 K/mm3 (1.2-5.4)  L  03/30/19  12:54    


 


Mono #  0.4 K/mm3 (0.0-0.8)   03/30/19  12:54    


 


Eos #  0.0 K/mm3 (0.0-0.4)   03/30/19  12:54    


 


Baso #  0.0 K/mm3 (0.0-0.1)   03/30/19  12:54    


 


Add Manual Diff  Complete   03/30/19  12:54    


 


Total Counted  100   03/29/19  15:43    


 


Seg Neutrophils %  49.1 % (40.0-70.0)   03/30/19  12:54    


 


Seg Neuts % (Manual)  54.0 % (40.0-70.0)   03/29/19  15:43    


 


Band Neutrophils %  0 %  03/29/19  15:43    


 


Lymphocytes % (Manual)  35.0 % (13.4-35.0)   03/29/19  15:43    


 


Reactive Lymphs % (Man)  0 %  03/29/19  15:43    


 


Monocytes % (Manual)  11.0 % (0.0-7.3)  H  03/29/19  15:43    


 


Eosinophils % (Manual)  0 % (0.0-4.3)   03/29/19  15:43    


 


Basophils % (Manual)  0 % (0.0-1.8)   03/29/19  15:43    


 


Metamyelocytes %  0 %  03/29/19  15:43    


 


Myelocytes %  0 %  03/29/19  15:43    


 


Promyelocytes %  0 %  03/29/19  15:43    


 


Blast Cells %  0 %  03/29/19  15:43    


 


Nucleated RBC %  Not Reportable   03/29/19  15:43    


 


Seg Neutrophils #  1.2 K/mm3 (1.8-7.7)  L  03/30/19  12:54    


 


Seg Neutrophils # Man  1.7 K/mm3 (1.8-7.7)  L  03/29/19  15:43    


 


Band Neutrophils #  0.0 K/mm3  03/29/19  15:43    


 


Lymphocytes # (Manual)  1.1 K/mm3 (1.2-5.4)  L  03/29/19  15:43    


 


Abs React Lymphs (Man)  0.0 K/mm3  03/29/19  15:43    


 


Monocytes # (Manual)  0.3 K/mm3 (0.0-0.8)   03/29/19  15:43    


 


Eosinophils # (Manual)  0.0 K/mm3 (0.0-0.4)   03/29/19  15:43    


 


Basophils # (Manual)  0.0 K/mm3 (0.0-0.1)   03/29/19  15:43    


 


Metamyelocytes #  0.0 K/mm3  03/29/19  15:43    


 


Myelocytes #  0.0 K/mm3  03/29/19  15:43    


 


Promyelocytes #  0.0 K/mm3  03/29/19  15:43    


 


Blast Cells #  0.0 K/mm3  03/29/19  15:43    


 


WBC Morphology  Not Reportable   03/29/19  15:43    


 


Hypersegmented Neuts  Not Reportable   03/29/19  15:43    


 


Hyposegmented Neuts  Not Reportable   03/29/19  15:43    


 


Hypogranular Neuts  Not Reportable   03/29/19  15:43    


 


Smudge Cells  Not Reportable   03/29/19  15:43    


 


Toxic Granulation  Not Reportable   03/29/19  15:43    


 


Toxic Vacuolation  Not Reportable   03/29/19  15:43    


 


Dohle Bodies  Not Reportable   03/29/19  15:43    


 


Pelger-Huet Anomaly  Not Reportable   03/29/19  15:43    


 


Gabino Rods  Not Reportable   03/29/19  15:43    


 


Platelet Estimate  Consistent w auto   03/29/19  15:43    


 


Clumped Platelets  Not Reportable   03/29/19  15:43    


 


Plt Clumps, EDTA  Not Reportable   03/29/19  15:43    


 


Large Platelets  Not Reportable   03/29/19  15:43    


 


Giant Platelets  Not Reportable   03/29/19  15:43    


 


Platelet Satelliting  Not Reportable   03/29/19  15:43    


 


Plt Morphology Comment  Not Reportable   03/29/19  15:43    


 


RBC Morphology  Not Reportable   03/29/19  15:43    


 


Dimorphic RBCs  Not Reportable   03/29/19  15:43    


 


Polychromasia  Not Reportable   03/29/19  15:43    


 


Hypochromasia  Not Reportable   03/29/19  15:43    


 


Poikilocytosis  1+   03/29/19  15:43    


 


Anisocytosis  1+   03/29/19  15:43    


 


Microcytosis  Not Reportable   03/29/19  15:43    


 


Macrocytosis  Not Reportable   03/29/19  15:43    


 


Spherocytes  Not Reportable   03/29/19  15:43    


 


Pappenheimer Bodies  Not Reportable   03/29/19  15:43    


 


Sickle Cells  Not Reportable   03/29/19  15:43    


 


Target Cells  Not Reportable   03/29/19  15:43    


 


Tear Drop Cells  Not Reportable   03/29/19  15:43    


 


Ovalocytes  Few   03/29/19  15:43    


 


Helmet Cells  Not Reportable   03/29/19  15:43    


 


Av[place] Bodies  Not Reportable   03/29/19  15:43    


 


Cabot Rings  Not Reportable   03/29/19  15:43    


 


Kendal Cells  Not Reportable   03/29/19  15:43    


 


Bite Cells  Not Reportable   03/29/19  15:43    


 


Crenated Cell  Not Reportable   03/29/19  15:43    


 


Elliptocytes  Few   03/29/19  15:43    


 


Acanthocytes (Spur)  Not Reportable   03/29/19  15:43    


 


Rouleaux  Not Reportable   03/29/19  15:43    


 


Hemoglobin C Crystals  Not Reportable   03/29/19  15:43    


 


Schistocytes  Not Reportable   03/29/19  15:43    


 


Malaria parasites  Not Reportable   03/29/19  15:43    


 


Michael Bodies  Not Reportable   03/29/19  15:43    


 


Hem Pathologist Commnt  No   03/29/19  15:43    


 


PT  13.6 Sec. (12.2-14.9)   03/29/19  15:43    


 


INR  0.98  (0.87-1.13)   03/29/19  15:43    


 


APTT  25.1 Sec. (24.2-36.6)   03/29/19  15:43    


 


D-Dimer  1149.82 ng/mlDDU (0-234)  H  03/29/19  15:43    


 


Sodium  142 mmol/L (137-145)   03/31/19  15:17    


 


Potassium  3.2 mmol/L (3.6-5.0)  L  03/31/19  15:17    


 


Chloride  103.5 mmol/L ()   03/31/19  15:17    


 


Carbon Dioxide  24 mmol/L (22-30)   03/31/19  15:17    


 


Anion Gap  18 mmol/L  03/31/19  15:17    


 


BUN  3 mg/dL (7-17)  L  03/31/19  15:17    


 


Creatinine  0.6 mg/dL (0.7-1.2)  L  03/31/19  15:17    


 


Estimated GFR  > 60 ml/min  03/31/19  15:17    


 


BUN/Creatinine Ratio  5 %  03/31/19  15:17    


 


Glucose  92 mg/dL ()   03/31/19  15:17    


 


Hemoglobin A1c  4.8 % (4-6)   03/30/19  00:27    


 


Calcium  8.9 mg/dL (8.4-10.2)   03/31/19  15:17    


 


Total Bilirubin  0.70 mg/dL (0.1-1.2)   03/30/19  05:38    


 


AST  21 units/L (5-40)   03/30/19  05:38    


 


ALT  6 units/L (7-56)  L  03/30/19  05:38    


 


Alkaline Phosphatase  39 units/L ()   03/30/19  05:38    


 


Troponin T  < 0.010 ng/mL (0.00-0.029)   03/29/19  21:19    


 


Total Protein  5.6 g/dL (6.3-8.2)  L  03/30/19  05:38    


 


Albumin  2.9 g/dL (3.9-5)  L  03/30/19  05:38    


 


Albumin/Globulin Ratio  1.1 %  03/30/19  05:38    


 


Triglycerides  103 mg/dL (2-149)   04/01/19  14:58    


 


Cholesterol  216 mg/dL ()  H  04/01/19  14:58    


 


LDL Cholesterol Direct  159 mg/dL ()  H  04/01/19  14:58    


 


HDL Cholesterol  47 mg/dL (40-59)   04/01/19  14:58    


 


Cholesterol/HDL Ratio  4.59 %  04/01/19  14:58    


 


Urine Color  Yellow  (Yellow)   03/29/19  19:00    


 


Urine Turbidity  Clear  (Clear)   03/29/19  19:00    


 


Urine pH  6.0  (5.0-7.0)   03/29/19  19:00    


 


Urine Protein  <15 mg/dl mg/dL (Negative)   03/29/19  19:00    


 


Urine Glucose (UA)  Neg mg/dL (Negative)   03/29/19  19:00    


 


Urine Ketones  80 mg/dL (Negative)   03/29/19  19:00    


 


Urine Blood  Neg  (Negative)   03/29/19  19:00    


 


Urine Nitrite  Neg  (Negative)   03/29/19  19:00    


 


Urine Bilirubin  Neg  (Negative)   03/29/19  19:00    


 


Urine Urobilinogen  4.0 mg/dL (<2.0)   03/29/19  19:00    


 


Ur Leukocyte Esterase  Mod  (Negative)   03/29/19  19:00    


 


Urine WBC (Auto)  27.0 /HPF (0.0-6.0)  H  03/29/19  19:00    


 


Urine RBC (Auto)  5.0 /HPF (0.0-6.0)   03/29/19  19:00    


 


U Epithel Cells (Auto)  1.0 /HPF (0-13.0)   03/29/19  19:00    


 


Urine Mucus  2+ /HPF  03/29/19  19:00    














Active Medications





- Current Medications


Current Medications: 














Generic Name Dose Route Start Last Admin





  Trade Name Freq  PRN Reason Stop Dose Admin


 


Acetaminophen  650 mg  03/29/19 23:26 





  Tylenol  PO  





  Q4H PRN  





  Pain MILD(1-3)/Fever >100.5/HA  


 


Atorvastatin Calcium  40 mg  04/01/19 22:00 





  Lipitor  PO  





  QHS GERARDO  


 


Enoxaparin Sodium  50 mg  03/30/19 22:00  04/01/19 14:22





  Lovenox  SUB-Q   50 mg





  BID GERARDO   Administration


 


Hydromorphone HCl  0.25 mg  03/29/19 23:26 





  Dilaudid  IV  





  Q3H PRN  





  Pain, Moderate (4-6)  


 


Sodium Chloride  1,000 mls @ 75 mls/hr  03/29/19 23:45  03/30/19 12:51





  Nacl 0.9% 1000 Ml  IV   75 mls/hr





  AS DIRECT GERARDO   Administration


 


Ceftriaxone Sodium  1 gm in 50 mls @ 100 mls/hr  03/30/19 10:00  04/01/19 14:28





  Rocephin/Ns 1 Gm/50 Ml  IV   100 mls/hr





  Q24HR GERARDO   Administration





  Protocol  


 


Ondansetron HCl  4 mg  03/29/19 23:26  03/30/19 09:32





  Zofran  IV   4 mg





  Q8H PRN   Administration





  Nausea And Vomiting  


 


Oxycodone/Acetaminophen  1 tab  03/29/19 23:26  03/30/19 09:32





  Percocet 5/325  PO   1 tab





  Q6H PRN   Administration





  Pain, Moderate (4-6)  


 


Polyethylene Glycol  17 gm  03/30/19 18:03  03/30/19 18:25





  Miralax 3350  PO   17 gm





  QDAY PRN   Administration





  Constipation  


 


Sodium Chloride  10 ml  03/30/19 10:00  04/01/19 14:30





  Sodium Chloride Flush Syringe 10 Ml  IV   10 ml





  BID GERARDO   Administration


 


Sodium Chloride  10 ml  03/29/19 23:26 





  Sodium Chloride Flush Syringe 10 Ml  IV  





  PRN PRN  





  LINE FLUSH  














Nutrition/Malnutrition Assess





- Dietary Evaluation


Nutrition/Malnutrition Findings: 


                                        





Nutrition Notes                                            Start:  03/30/19 

14:12


Freq:                                                      Status: Active       




Protocol:                                                                       




 Document     03/31/19 12:28  OL  (Rec: 03/31/19 12:42  OL  [id number])


 Nutrition Notes


     Initial or Follow up                        Assessment


     Current Diagnosis                           Stroke


     Other Pertinent Diagnosis                   dementia, acute pulmonary


                                                 embolism


     Current Diet                                regular


     Labs/Tests                                  Reviewed


     Pertinent Medications                       Reviewed


     Height                                      5 ft


     Weight                                      45 kg


     Ideal Body Weight (kg)                      45.45


     BMI                                         19.3


     Subjective/Other Information                Pt. pleasantly confused. Pt.


                                                 thin but states she is eating


                                                 well. Unsure of PO intake


                                                 prior to admission. Pt.


                                                 amenable to ONS.


     #1


      Nutrition Diagnosis                        Predicted suboptimal energy


                                                 intake


      Etiology                                   AMS


      As Evidenced by Signs and Symptoms         low BMI for age


     Is patient on ventilator?                   No


     Is Patient Ambulatory and/or Out of Bed     No


     REE-([facility]-confined to bed)      [phone]


     Kcal/Kg value to use for calculation        30


     Approximate Energy Requirements Using       1350


      kcal/Kg                                    


     Additional Notes                            protein (1.2-1.5g/kg): 54-68g


                                                 fluid: 1mL/kcal or per MD


 Nutrition Intervention


     Change Diet Order:                          Continue regular


     Add Supplement/Snack (indicate name/kcal    Ensure Enlive BID


      /protein )                                 


     Provides kCal:                              700


     Provides Protein (gm)                       40


     Goal #1                                     Diet to meet % of


                                                 nutrient needs


     Anticipated Discharge Needs:                Regular


     Follow-Up By:                               04/02/19


     Additional Comments                         f/u : intakes

## 2019-04-01 NOTE — PROGRESS NOTE
Assessment and Plan


Assessment and plan: 





Patient is 88 yo with atrial fibrillation, recent pulmonary embolism 1-2 months 

ago(daughter not sure). She has been having confusion for several months and was

seeing outpatient neurology evaluating for possible dementia. She was sent for 

outpatient MRI and while at facility developed shortness of breath, dizziness 

therefore brought to ED. CT Chest showed bilt PE which is probably residual from

1-2 months ago. She was started on Lovenox 1mg/kg bid. She was on Eliquis 5mg po

once daily. Recommended dose for PE is 5mg po bid so will discontinue Lovenox 

and put on Eliquis 5mg po bid. Daughter requesting neuro eval for altered mental

status for months. seen by Dr. Gerard. MRI Brain pending. Patient stable to dc home

on Eliquis 5mg bid if MRI unremarkable.





Acute resp failure due to bilateral pulmonary embolism


Supplemental Oxygen


Pulmonology following





Pulmonary embolism


She was Started on Lovenox 1mg/kg bid


Will switch back to Eliquis she was taking at home but will change dose to 5mg 

bid which is recommended dose for PE


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

at home


Daughter at bedside says patient was just diagnosed with pulm embolism Jan or 

Feb, 1-2 minths ago








History of Pulmonary embolism





History of DVT, details unavailable





Atrial fibrillation.





UTI


Started on Ceftriaxone





Hypokalemia.


replace and recheck





Encephalopathy,altered mental status


MRI Brain





Poss Dementia.


daughter states patient was seeing Dr. Gilmar Desouza, neurology was undergoing 

evaluation for confusion,agitation to rule out dementia. She requests seeing 

neurologist here. Consulted and discussed with Dr. Gerard.








FULL CODE STATUS.








History


Interval history: 


Shortness of breath


Agitation on and off


Confusion on and off for several  months


Daughter at bedside states she had PE in Jan or Feb, that is 1-2 months ago.


Also daughter states she was being seen by Neurologist to evaluate for poss 

dementia








Hospitalist Physical





- Physical exam


Narrative exam: 


GEN: Not in acute distress, lying in bed, 


HEENT: Normocephalic, atraumatic, 


Neck: supple, No JVD


heart: S1 and S2 reg, no murmurs, rubs or gallop


Lungs: clear to auscultation bilateral, no crackles, no wheeze 


Abd:soft, non tender, non distended, normal bowel sounds


Ext:No edema, no clubbing or cyanosis


Neuro:Awake,alert, confused , agitated on and off, no focal signs, moves all ext


Psych: normal mood











- Constitutional


Vitals: 


                                        











Temp Pulse Resp BP Pulse Ox


 


 98.0 F   73   18   114/76   96 


 


 04/01/19 04:00  04/01/19 04:00  04/01/19 04:00  04/01/19 04:00  04/01/19 04:00














Results





- Labs


CBC & Chem 7: 


                                 03/31/19 15:17





                                 03/31/19 15:17


Labs: 


                             Laboratory Last Values











WBC  3.5 K/mm3 (4.5-11.0)  L  03/31/19  15:17    


 


RBC  3.87 M/mm3 (3.65-5.03)   03/31/19  15:17    


 


Hgb  11.1 gm/dl (10.1-14.3)   03/31/19  15:17    


 


Hct  33.5 % (30.3-42.9)   03/31/19  15:17    


 


MCV  87 fl (79-97)   03/31/19  15:17    


 


MCH  29 pg (28-32)   03/31/19  15:17    


 


MCHC  33 % (30-34)   03/31/19  15:17    


 


RDW  16.5 % (13.2-15.2)  H  03/31/19  15:17    


 


Plt Count  221 K/mm3 (140-440)   03/31/19  15:17    


 


Lymph % (Auto)  33.5 % (13.4-35.0)   03/30/19  12:54    


 


Mono % (Auto)  16.0 % (0.0-7.3)  H  03/30/19  12:54    


 


Eos % (Auto)  0.6 % (0.0-4.3)   03/30/19  12:54    


 


Baso % (Auto)  0.8 % (0.0-1.8)   03/30/19  12:54    


 


Lymph #  0.9 K/mm3 (1.2-5.4)  L  03/30/19  12:54    


 


Mono #  0.4 K/mm3 (0.0-0.8)   03/30/19  12:54    


 


Eos #  0.0 K/mm3 (0.0-0.4)   03/30/19  12:54    


 


Baso #  0.0 K/mm3 (0.0-0.1)   03/30/19  12:54    


 


Add Manual Diff  Complete   03/30/19  12:54    


 


Total Counted  100   03/29/19  15:43    


 


Seg Neutrophils %  49.1 % (40.0-70.0)   03/30/19  12:54    


 


Seg Neuts % (Manual)  54.0 % (40.0-70.0)   03/29/19  15:43    


 


Band Neutrophils %  0 %  03/29/19  15:43    


 


Lymphocytes % (Manual)  35.0 % (13.4-35.0)   03/29/19  15:43    


 


Reactive Lymphs % (Man)  0 %  03/29/19  15:43    


 


Monocytes % (Manual)  11.0 % (0.0-7.3)  H  03/29/19  15:43    


 


Eosinophils % (Manual)  0 % (0.0-4.3)   03/29/19  15:43    


 


Basophils % (Manual)  0 % (0.0-1.8)   03/29/19  15:43    


 


Metamyelocytes %  0 %  03/29/19  15:43    


 


Myelocytes %  0 %  03/29/19  15:43    


 


Promyelocytes %  0 %  03/29/19  15:43    


 


Blast Cells %  0 %  03/29/19  15:43    


 


Nucleated RBC %  Not Reportable   03/29/19  15:43    


 


Seg Neutrophils #  1.2 K/mm3 (1.8-7.7)  L  03/30/19  12:54    


 


Seg Neutrophils # Man  1.7 K/mm3 (1.8-7.7)  L  03/29/19  15:43    


 


Band Neutrophils #  0.0 K/mm3  03/29/19  15:43    


 


Lymphocytes # (Manual)  1.1 K/mm3 (1.2-5.4)  L  03/29/19  15:43    


 


Abs React Lymphs (Man)  0.0 K/mm3  03/29/19  15:43    


 


Monocytes # (Manual)  0.3 K/mm3 (0.0-0.8)   03/29/19  15:43    


 


Eosinophils # (Manual)  0.0 K/mm3 (0.0-0.4)   03/29/19  15:43    


 


Basophils # (Manual)  0.0 K/mm3 (0.0-0.1)   03/29/19  15:43    


 


Metamyelocytes #  0.0 K/mm3  03/29/19  15:43    


 


Myelocytes #  0.0 K/mm3  03/29/19  15:43    


 


Promyelocytes #  0.0 K/mm3  03/29/19  15:43    


 


Blast Cells #  0.0 K/mm3  03/29/19  15:43    


 


WBC Morphology  Not Reportable   03/29/19  15:43    


 


Hypersegmented Neuts  Not Reportable   03/29/19  15:43    


 


Hyposegmented Neuts  Not Reportable   03/29/19  15:43    


 


Hypogranular Neuts  Not Reportable   03/29/19  15:43    


 


Smudge Cells  Not Reportable   03/29/19  15:43    


 


Toxic Granulation  Not Reportable   03/29/19  15:43    


 


Toxic Vacuolation  Not Reportable   03/29/19  15:43    


 


Dohle Bodies  Not Reportable   03/29/19  15:43    


 


Pelger-Huet Anomaly  Not Reportable   03/29/19  15:43    


 


Gabino Rods  Not Reportable   03/29/19  15:43    


 


Platelet Estimate  Consistent w auto   03/29/19  15:43    


 


Clumped Platelets  Not Reportable   03/29/19  15:43    


 


Plt Clumps, EDTA  Not Reportable   03/29/19  15:43    


 


Large Platelets  Not Reportable   03/29/19  15:43    


 


Giant Platelets  Not Reportable   03/29/19  15:43    


 


Platelet Satelliting  Not Reportable   03/29/19  15:43    


 


Plt Morphology Comment  Not Reportable   03/29/19  15:43    


 


RBC Morphology  Not Reportable   03/29/19  15:43    


 


Dimorphic RBCs  Not Reportable   03/29/19  15:43    


 


Polychromasia  Not Reportable   03/29/19  15:43    


 


Hypochromasia  Not Reportable   03/29/19  15:43    


 


Poikilocytosis  1+   03/29/19  15:43    


 


Anisocytosis  1+   03/29/19  15:43    


 


Microcytosis  Not Reportable   03/29/19  15:43    


 


Macrocytosis  Not Reportable   03/29/19  15:43    


 


Spherocytes  Not Reportable   03/29/19  15:43    


 


Pappenheimer Bodies  Not Reportable   03/29/19  15:43    


 


Sickle Cells  Not Reportable   03/29/19  15:43    


 


Target Cells  Not Reportable   03/29/19  15:43    


 


Tear Drop Cells  Not Reportable   03/29/19  15:43    


 


Ovalocytes  Few   03/29/19  15:43    


 


Helmet Cells  Not Reportable   03/29/19  15:43    


 


[place] Bodies  Not Reportable   03/29/19  15:43    


 


Cabot Rings  Not Reportable   03/29/19  15:43    


 


[place]al Cells  Not Reportable   03/29/19  15:43    


 


Bite Cells  Not Reportable   03/29/19  15:43    


 


Crenated Cell  Not Reportable   03/29/19  15:43    


 


Elliptocytes  Few   03/29/19  15:43    


 


Acanthocytes (Spur)  Not Reportable   03/29/19  15:43    


 


Rouleaux  Not Reportable   03/29/19  15:43    


 


Hemoglobin C Crystals  Not Reportable   03/29/19  15:43    


 


Schistocytes  Not Reportable   03/29/19  15:43    


 


Malaria parasites  Not Reportable   03/29/19  15:43    


 


Michael Bodies  Not Reportable   03/29/19  15:43    


 


Hem Pathologist Commnt  No   03/29/19  15:43    


 


PT  13.6 Sec. (12.2-14.9)   03/29/19  15:43    


 


INR  0.98  (0.87-1.13)   03/29/19  15:43    


 


APTT  25.1 Sec. (24.2-36.6)   03/29/19  15:43    


 


D-Dimer  1149.82 ng/mlDDU (0-234)  H  03/29/19  15:43    


 


Sodium  142 mmol/L (137-145)   03/31/19  15:17    


 


Potassium  3.2 mmol/L (3.6-5.0)  L  03/31/19  15:17    


 


Chloride  103.5 mmol/L ()   03/31/19  15:17    


 


Carbon Dioxide  24 mmol/L (22-30)   03/31/19  15:17    


 


Anion Gap  18 mmol/L  03/31/19  15:17    


 


BUN  3 mg/dL (7-17)  L  03/31/19  15:17    


 


Creatinine  0.6 mg/dL (0.7-1.2)  L  03/31/19  15:17    


 


Estimated GFR  > 60 ml/min  03/31/19  15:17    


 


BUN/Creatinine Ratio  5 %  03/31/19  15:17    


 


Glucose  92 mg/dL ()   03/31/19  15:17    


 


Hemoglobin A1c  4.8 % (4-6)   03/30/19  00:27    


 


Calcium  8.9 mg/dL (8.4-10.2)   03/31/19  15:17    


 


Total Bilirubin  0.70 mg/dL (0.1-1.2)   03/30/19  05:38    


 


AST  21 units/L (5-40)   03/30/19  05:38    


 


ALT  6 units/L (7-56)  L  03/30/19  05:38    


 


Alkaline Phosphatase  39 units/L ()   03/30/19  05:38    


 


Troponin T  < 0.010 ng/mL (0.00-0.029)   03/29/19  21:19    


 


Total Protein  5.6 g/dL (6.3-8.2)  L  03/30/19  05:38    


 


Albumin  2.9 g/dL (3.9-5)  L  03/30/19  05:38    


 


Albumin/Globulin Ratio  1.1 %  03/30/19  05:38    


 


Triglycerides  103 mg/dL (2-149)   04/01/19  14:58    


 


Cholesterol  216 mg/dL ()  H  04/01/19  14:58    


 


LDL Cholesterol Direct  159 mg/dL ()  H  04/01/19  14:58    


 


HDL Cholesterol  47 mg/dL (40-59)   04/01/19  14:58    


 


Cholesterol/HDL Ratio  4.59 %  04/01/19  14:58    


 


Urine Color  Yellow  (Yellow)   03/29/19  19:00    


 


Urine Turbidity  Clear  (Clear)   03/29/19  19:00    


 


Urine pH  6.0  (5.0-7.0)   03/29/19  19:00    


 


Urine Protein  <15 mg/dl mg/dL (Negative)   03/29/19  19:00    


 


Urine Glucose (UA)  Neg mg/dL (Negative)   03/29/19  19:00    


 


Urine Ketones  80 mg/dL (Negative)   03/29/19  19:00    


 


Urine Blood  Neg  (Negative)   03/29/19  19:00    


 


Urine Nitrite  Neg  (Negative)   03/29/19  19:00    


 


Urine Bilirubin  Neg  (Negative)   03/29/19  19:00    


 


Urine Urobilinogen  4.0 mg/dL (<2.0)   03/29/19  19:00    


 


Ur Leukocyte Esterase  Mod  (Negative)   03/29/19  19:00    


 


Urine WBC (Auto)  27.0 /HPF (0.0-6.0)  H  03/29/19  19:00    


 


Urine RBC (Auto)  5.0 /HPF (0.0-6.0)   03/29/19  19:00    


 


U Epithel Cells (Auto)  1.0 /HPF (0-13.0)   03/29/19  19:00    


 


Urine Mucus  2+ /HPF  03/29/19  19:00    














Active Medications





- Current Medications


Current Medications: 














Generic Name Dose Route Start Last Admin





  Trade Name Freq  PRN Reason Stop Dose Admin


 


Acetaminophen  650 mg  03/29/19 23:26 





  Tylenol  PO  





  Q4H PRN  





  Pain MILD(1-3)/Fever >100.5/HA  


 


Atorvastatin Calcium  40 mg  04/01/19 22:00 





  Lipitor  PO  





  QHS GERARDO  


 


Enoxaparin Sodium  50 mg  03/30/19 22:00  04/01/19 14:22





  Lovenox  SUB-Q   50 mg





  BID GERARDO   Administration


 


Hydromorphone HCl  0.25 mg  03/29/19 23:26 





  Dilaudid  IV  





  Q3H PRN  





  Pain, Moderate (4-6)  


 


Sodium Chloride  1,000 mls @ 75 mls/hr  03/29/19 23:45  03/30/19 12:51





  Nacl 0.9% 1000 Ml  IV   75 mls/hr





  AS DIRECT GERARDO   Administration


 


Ceftriaxone Sodium  1 gm in 50 mls @ 100 mls/hr  03/30/19 10:00  04/01/19 14:28





  Rocephin/Ns 1 Gm/50 Ml  IV   100 mls/hr





  Q24HR GERARDO   Administration





  Protocol  


 


Ondansetron HCl  4 mg  03/29/19 23:26  03/30/19 09:32





  Zofran  IV   4 mg





  Q8H PRN   Administration





  Nausea And Vomiting  


 


Oxycodone/Acetaminophen  1 tab  03/29/19 23:26  03/30/19 09:32





  Percocet 5/325  PO   1 tab





  Q6H PRN   Administration





  Pain, Moderate (4-6)  


 


Polyethylene Glycol  17 gm  03/30/19 18:03  03/30/19 18:25





  Miralax 3350  PO   17 gm





  QDAY PRN   Administration





  Constipation  


 


Sodium Chloride  10 ml  03/30/19 10:00  04/01/19 14:30





  Sodium Chloride Flush Syringe 10 Ml  IV   10 ml





  BID GERARDO   Administration


 


Sodium Chloride  10 ml  03/29/19 23:26 





  Sodium Chloride Flush Syringe 10 Ml  IV  





  PRN PRN  





  LINE FLUSH  














Nutrition/Malnutrition Assess





- Dietary Evaluation


Nutrition/Malnutrition Findings: 


                                        





Nutrition Notes                                            Start:  03/30/19 

14:12


Freq:                                                      Status: Active       




Protocol:                                                                       




 Document     03/31/19 12:28  OL  (Rec: 03/31/19 12:42  OL  [id number])


 Nutrition Notes


     Initial or Follow up                        Assessment


     Current Diagnosis                           Stroke


     Other Pertinent Diagnosis                   dementia, acute pulmonary


                                                 embolism


     Current Diet                                regular


     Labs/Tests                                  Reviewed


     Pertinent Medications                       Reviewed


     Height                                      5 ft


     Weight                                      45 kg


     Ideal Body Weight (kg)                      45.45


     BMI                                         19.3


     Subjective/Other Information                Pt. pleasantly confused. Pt.


                                                 thin but states she is eating


                                                 well. Unsure of PO intake


                                                 prior to admission. Pt.


                                                 amenable to ONS.


     #1


      Nutrition Diagnosis                        Predicted suboptimal energy


                                                 intake


      Etiology                                   AMS


      As Evidenced by Signs and Symptoms         low BMI for age


     Is patient on ventilator?                   No


     Is Patient Ambulatory and/or Out of Bed     No


     REE-([place]-St. Jeor-confined to bed)      [phone]


     Kcal/Kg value to use for calculation        30


     Approximate Energy Requirements Using       1350


      kcal/Kg                                    


     Additional Notes                            protein (1.2-1.5g/kg): 54-68g


                                                 fluid: 1mL/kcal or per MD


 Nutrition Intervention


     Change Diet Order:                          Continue regular


     Add Supplement/Snack (indicate name/kcal    Ensure Enlive BID


      /protein )                                 


     Provides kCal:                              700


     Provides Protein (gm)                       40


     Goal #1                                     Diet to meet % of


                                                 nutrient needs


     Anticipated Discharge Needs:                Regular


     Follow-Up By:                               04/02/19


     Additional Comments                         f/u : intakes

## 2019-04-01 NOTE — CONSULTATION
History of Present Illness


Consult date: 19


Requesting physician: SHANE ALCARAZ


Reason for Consult: Dementia evaluation


History of present illness: 





87 year old right handed female presented on 3/29/19 with dizziness and 

shortness of breath, found to have pulmonary emboli.  She has a history of 

previous PE, atrial fibrillation, PVCs, CAD, hypertension and IBS.  Both patient

and daughter are concerned about short term memory problems, confusion and 

agitation at night.  The patient ix aware of these issues.  She has seen a 

neurologist who was beginning a work-up of these problems; in fact she was about

to get an MRI scan done as an outpatient when she developed the symptoms of PE 

and was brought to the ER.  


The patient states that she completed college and worked as a third grade 

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  Since retiring

she has had 2 hip replacements, she stays in her home most of the time, 

ambulates with a walker.  She needs help getting dressed.  Patient and daughter 

note that she will forget new information and need to repeat questions from time

to time.  She will forget names, sometimes of great grandchildren and other 

acquaintances she has known for a long time.  She has occasional headache.  

Denies problems with vision.  Denies previous stroke.  There is no family 

history of dementia.  Mother  at 98 (TB and staff infection), Father had a 

stroke.  One sybling had a heart attack.  No one has complained of memory 

issues.





Past History


Past Medical History: atrial fib, CAD, hypertension


Past Surgical History: total hip replacement


Social history: lives with family


Family history: CAD, hypertension, stroke





Medications and Allergies


                                    Allergies











Allergy/AdvReac Type Severity Reaction Status Date / Time


 


No Known Allergies Allergy   Verified 19 23:19











                                Home Medications











 Medication  Instructions  Recorded  Confirmed  Last Taken  Type


 


Apixaban [Eliquis] 5 mg PO DAILY 19 Unknown History











Active Meds: 


Active Medications





Acetaminophen (Tylenol)  650 mg PO Q4H PRN


   PRN Reason: Pain MILD(1-3)/Fever >100.5/HA


Enoxaparin Sodium (Lovenox)  50 mg SUB-Q BID GERARDO


   Last Admin: 19 21:47 Dose:  50 mg


   Documented by: 


Hydromorphone HCl (Dilaudid)  0.25 mg IV Q3H PRN


   PRN Reason: Pain, Moderate (4-6)


Sodium Chloride (Nacl 0.9% 1000 Ml)  1,000 mls @ 75 mls/hr IV AS DIRECT GERARDO


   Last Admin: 19 12:51 Dose:  75 mls/hr


   Documented by: 


Ceftriaxone Sodium (Rocephin/Ns 1 Gm/50 Ml)  1 gm in 50 mls @ 100 mls/hr IV 

Q24HR GERARDO; Protocol


   Last Admin: 19 10:21 Dose:  100 mls/hr


   Documented by: 


Ondansetron HCl (Zofran)  4 mg IV Q8H PRN


   PRN Reason: Nausea And Vomiting


   Last Admin: 19 09:32 Dose:  4 mg


   Documented by: 


Oxycodone/Acetaminophen (Percocet 5/325)  1 tab PO Q6H PRN


   PRN Reason: Pain, Moderate (4-6)


   Last Admin: 19 09:32 Dose:  1 tab


   Documented by: 


Polyethylene Glycol (Miralax 3350)  17 gm PO QDAY PRN


   PRN Reason: Constipation


   Last Admin: 19 18:25 Dose:  17 gm


   Documented by: 


Sodium Chloride (Sodium Chloride Flush Syringe 10 Ml)  10 ml IV BID GERARDO


   Last Admin: 19 21:48 Dose:  10 ml


   Documented by: 


Sodium Chloride (Sodium Chloride Flush Syringe 10 Ml)  10 ml IV PRN PRN


   PRN Reason: LINE FLUSH











Review of Systems


All systems: negative


Constitutional: poor appetite


Cardiovascular: rapid/irregular heart beat


Neurological: confusion, memory loss, no numbness, no tingling, no seizures, no 

syncope, no lack of coordination, no motor disturbance, no sensory deficit, no 

double vision, no loss of vision


Psychiatric: anxiety, memory loss





Physical Examination





- Vital Signs


Vital Signs: 


                                   Vital Signs











Resp


 


 16 


 


 19 14:51








General - Sitting in bed, eating lunch.  No distress.





Neurological exam - 


Speech fluent.  Relates her history well.  


   MMSE 25/30.  [place] with calculation, did not know the year, could not copy

 a construction.  Reading and writing intact.





CNs - EOMs full, no nystagmus.  Face symmetric, tongue midline, 


   V-1 thru V-3 intact.  Hearing decreased both ears.


Motor - symmetric in all 4 limbs.  


Reflexes - +2 throughout. absent ankle jerks.


Sensory - intact to touch and pin, symmetric.


Cerebellar - FTN, Allison, fine finger movements intact.





- EENT


EENT: Present: PERRL





- Respiratory


Respiratory: Present: lungs clear, normal breath sounds, no respiratory distress





- Cardiovascular


Cardiovascular: Present: regular rate, normal S1, normal S2, no murmurs


Extremities: Present: no peripheral edema bilatateraly, no ischemia or petechiae





- Gastrointestinal


Gastrointestinal: Present: normoactive bowel sounds, soft, non-tender





Results





- Laboratory Findings


CBC and BMP: 


                                 19 15:17





                                 19 15:17


Abnormal Lab Findings: 


                                  Abnormal Labs











  19





  15:43 15:43 15:43


 


WBC   3.1 L 


 


RDW   16.2 H 


 


Mono % (Auto)   


 


Lymph #   


 


Monocytes % (Manual)   11.0 H 


 


Seg Neutrophils #   


 


Seg Neutrophils # Man   1.7 L 


 


Lymphocytes # (Manual)   1.1 L 


 


D-Dimer  1149.82 H  


 


Potassium   


 


Chloride    97.4 L


 


BUN   


 


Creatinine    0.5 L


 


Glucose    103 H


 


ALT   


 


Total Protein   


 


Albumin   


 


Urine WBC (Auto)   














  19





  19:00 05:38 12:54


 


WBC    2.5 L


 


RDW    16.4 H


 


Mono % (Auto)    16.0 H


 


Lymph #    0.9 L


 


Monocytes % (Manual)   


 


Seg Neutrophils #    1.2 L


 


Seg Neutrophils # Man   


 


Lymphocytes # (Manual)   


 


D-Dimer   


 


Potassium   3.0 L 


 


Chloride   


 


BUN   5 L 


 


Creatinine   0.4 L 


 


Glucose   


 


ALT   6 L 


 


Total Protein   5.6 L 


 


Albumin   2.9 L 


 


Urine WBC (Auto)  27.0 H  














  19





  15:17 15:17


 


WBC  3.5 L 


 


RDW  16.5 H 


 


Mono % (Auto)  


 


Lymph #  


 


Monocytes % (Manual)  


 


Seg Neutrophils #  


 


Seg Neutrophils # Man  


 


Lymphocytes # (Manual)  


 


D-Dimer  


 


Potassium   3.2 L


 


Chloride  


 


BUN   3 L


 


Creatinine   0.6 L


 


Glucose  


 


ALT  


 


Total Protein  


 


Albumin  


 


Urine WBC (Auto)  














Assessment and Plan





87 year old female with problems recalling recent information and names, 

occasional agitation at night and not knowing where she is at night.  The 

patient herself seems to have insight into these problems.  CT brain reveals 

calcified carotid and vertebral arteries, but no remarkable small vessel disease

 in the brain.  Echo reveals an EF of 40%.  An MRI scan of the brain is pending.

  The patient's daughter states that the neurologist who has seen her has 

already taken  a number of blood tests, as well as EEG.


At this point I believe the patient has mild cognitive impairment with anxiety. 

 I will refrain from repeating dementia labs at this time.  She is on 

anticoagulation.  Will check lipids.





Plan - Lipid panel

## 2019-04-02 NOTE — PROGRESS NOTE
Assessment and Plan





Patient is weak, confused.No acute respiratory distress. On 2 litres o2. O2 

saturation 100%.Patient diagnosed with pulmonary embolism.Patient is on S/C 

Lovenox 50 mg S/C BID and switched ot oral medication Apexaban.





- Patient Problems


(1) Acute pulmonary embolism


Current Visit: Yes   Status: Acute   


Qualifiers: 


   Acute cor pulmonale presence: without acute cor pulmonale 


Plan to address problem: 


Patient is on S/C Lovenox and switched to Apexaban.


 Patient is on O2 supplementation.








(2) UTI (urinary tract infection)


Current Visit: Yes   Status: Acute   


Qualifiers: 


   Urinary tract infection type: acute cystitis 


Plan to address problem: 


Patient is on ceftrioxone.








(3) A-fib


Current Visit: Yes   Status: Chronic   


Qualifiers: 


   Atrial fibrillation type: paroxysmal   Qualified Code(s): I48.0 - Paroxysmal 

atrial fibrillation   


Plan to address problem: 


Patient is on Apexaban.








Subjective


Date of service: 04/02/19


Interval history: 





Patient is weak, confused.No acute respiratory distress. On 2 litres o2. O2 

saturation 100%.Patient diagnosed with pulmonary embolism.Patient is on S/C 

Lovenox 50 mg S/C BID and switched oral medication APixaban.





Objective


                               Vital Signs - 12hr











  04/02/19 04/02/19 04/02/19





  05:00 08:00 10:00


 


Temperature 98.1 F 98.2 F 


 


Pulse Rate 72 75 


 


Pulse Rate [   75





Apical]   


 


Pulse Rate [   75





Left Radial]   


 


Pulse Rate [   75





Right Radial]   


 


Respiratory 14 18 19





Rate   


 


Blood Pressure   


 


Blood Pressure 133/81 140/84 





[Left]   


 


O2 Sat by Pulse 100 100 99





Oximetry   














  04/02/19 04/02/19





  11:42 13:31


 


Temperature 98.3 F 


 


Pulse Rate 80 


 


Pulse Rate [  





Apical]  


 


Pulse Rate [  





Left Radial]  


 


Pulse Rate [  





Right Radial]  


 


Respiratory 18 





Rate  


 


Blood Pressure 138/80 


 


Blood Pressure  





[Left]  


 


O2 Sat by Pulse 100 100





Oximetry  











Constitutional: alert, appears uncomfortable, other (Weak, confused.)


Eyes: non-icteric


ENT: oropharynx moist


Neck: supple, no lymphadenopathy


Ascultation: Bilateral: diminished breath sounds


Cardiovascular: regular rate and rhythm


Gastrointestinal: normoactive bowel sounds, soft, non-tender


Integumentary: normal


Extremities: no cyanosis, no edema


Neurologic: non-focal exam, pupils equal and round, other (Confused.)


Psychiatric: depressed


CBC and BMP: 


                                 04/02/19 06:19





                                 04/02/19 06:19


ABG, PT/INR, D-dimer: 


PT/INR, D-dimer











PT  13.6 Sec. (12.2-14.9)   03/29/19  15:43    


 


INR  0.98  (0.87-1.13)   03/29/19  15:43    


 


D-Dimer  1149.82 ng/mlDDU (0-234)  H  03/29/19  15:43    








Abnormal lab findings: 


                                  Abnormal Labs











  03/29/19 03/29/19 03/29/19





  15:43 15:43 15:43


 


WBC   3.1 L 


 


RDW   16.2 H 


 


Mono % (Auto)   


 


Lymph #   


 


Monocytes % (Manual)   11.0 H 


 


Seg Neutrophils #   


 


Seg Neutrophils # Man   1.7 L 


 


Lymphocytes # (Manual)   1.1 L 


 


D-Dimer  1149.82 H  


 


Sodium   


 


Potassium   


 


Chloride    97.4 L


 


BUN   


 


Creatinine    0.5 L


 


Glucose    103 H


 


ALT   


 


Total Protein   


 


Albumin   


 


Cholesterol   


 


LDL Cholesterol Direct   


 


Urine WBC (Auto)   














  03/29/19 03/30/19 03/30/19





  19:00 05:38 12:54


 


WBC    2.5 L


 


RDW    16.4 H


 


Mono % (Auto)    16.0 H


 


Lymph #    0.9 L


 


Monocytes % (Manual)   


 


Seg Neutrophils #    1.2 L


 


Seg Neutrophils # Man   


 


Lymphocytes # (Manual)   


 


D-Dimer   


 


Sodium   


 


Potassium   3.0 L 


 


Chloride   


 


BUN   5 L 


 


Creatinine   0.4 L 


 


Glucose   


 


ALT   6 L 


 


Total Protein   5.6 L 


 


Albumin   2.9 L 


 


Cholesterol   


 


LDL Cholesterol Direct   


 


Urine WBC (Auto)  27.0 H  














  03/31/19 03/31/19 04/01/19





  15:17 15:17 14:58


 


WBC  3.5 L  


 


RDW  16.5 H  


 


Mono % (Auto)   


 


Lymph #   


 


Monocytes % (Manual)   


 


Seg Neutrophils #   


 


Seg Neutrophils # Man   


 


Lymphocytes # (Manual)   


 


D-Dimer   


 


Sodium   


 


Potassium   3.2 L 


 


Chloride   


 


BUN   3 L 


 


Creatinine   0.6 L 


 


Glucose   


 


ALT   


 


Total Protein   


 


Albumin   


 


Cholesterol    216 H


 


LDL Cholesterol Direct    159 H


 


Urine WBC (Auto)   














  04/02/19 04/02/19





  06:19 06:19


 


WBC  2.2 L 


 


RDW  16.5 H 


 


Mono % (Auto)  


 


Lymph #  


 


Monocytes % (Manual)  


 


Seg Neutrophils #  


 


Seg Neutrophils # Man  


 


Lymphocytes # (Manual)  


 


D-Dimer  


 


Sodium   146 H


 


Potassium   3.1 L


 


Chloride   107.5 H


 


BUN   4 L


 


Creatinine   0.5 L


 


Glucose  


 


ALT  


 


Total Protein  


 


Albumin  


 


Cholesterol  


 


LDL Cholesterol Direct  


 


Urine WBC (Auto)

## 2019-04-02 NOTE — PROGRESS NOTE
Assessment and Plan





87 year old female with problems recalling recent information and names, 

occasional agitation at night and not knowing where she is at night.  The 

patient herself seems to have insight into these problems.  CT brain reveals 

calcified carotid and vertebral arteries, but no remarkable small vessel disease

in the brain.  Echo reveals an EF of 40%.  An MRI scan of the brain was 

performed and reveals no acute changes.  There is a small amount of 

periventricular white matter disease only.  A lipid panel reveals elevated 

cholesterol and atorvastatin has been initiated.





Plan - Mobilize


   consult PT/OT





Subjective


Date of service: 04/02/19


Principal diagnosis: Pulmonary emboli, mild cognitive impairment


Interval history: 





87 year old right handed female presented on 3/29/19 with dizziness and 

shortness of breath, found to have pulmonary emboli.  She has a history of 

previous PE, atrial fibrillation, PVCs, CAD, hypertension and IBS.  Both patient

and daughter are concerned about short term memory problems, confusion and 

agitation at night.  The patient ix aware of these issues.  She has seen a 

neurologist who was beginning a work-up of these problems.  Nevertheless, they 

requested to see a neurologist on this admission.  We opted only to check an MRI

scan as daughter notes that multiple blood tests have been done, EEG also and 

they are merely waiting for the results.


There have been no changes over night





Objective





- Exam


Narrative Exam: 





General - Sitting in bed, eating lunch.  No distress.





Neurological exam - 


Speech fluent.  Relates her history well.  


   MMSE 25/30.  [place] with calculation, did not know the year, could not copy

a construction.  Reading and writing intact.





CNs - EOMs full, no nystagmus.  Face symmetric, tongue midline, 


   V-1 thru V-3 intact.  Hearing decreased both ears.


Motor - symmetric in all 4 limbs.  


Reflexes - +2 throughout. absent ankle jerks.


Sensory - intact to touch and pin, symmetric.


Cerebellar - FTN, Allison, fine finger movements intact.








- Vital Sign


                               Vital Signs - 12hr











  04/02/19 04/02/19 04/02/19





  05:00 08:00 10:00


 


Temperature 98.1 F 98.2 F 


 


Pulse Rate 72 75 75


 


Pulse Rate [   75





Apical]   


 


Pulse Rate [   75





Left Radial]   


 


Pulse Rate [   75





Right Radial]   


 


Respiratory 14 18 19





Rate   


 


Blood Pressure   


 


Blood Pressure 133/81 140/84 





[Left]   


 


O2 Sat by Pulse 100 100 99





Oximetry   














  04/02/19 04/02/19





  11:42 13:31


 


Temperature 98.3 F 


 


Pulse Rate 80 


 


Pulse Rate [  





Apical]  


 


Pulse Rate [  





Left Radial]  


 


Pulse Rate [  





Right Radial]  


 


Respiratory 18 





Rate  


 


Blood Pressure 138/80 


 


Blood Pressure  





[Left]  


 


O2 Sat by Pulse 100 100





Oximetry  














- Laboratory Findings


CBC and BMP: 


                                 04/02/19 06:19





                                 04/02/19 06:19


Abnormal Lab Findings: 


                                  Abnormal Labs











  03/29/19 03/29/19 03/29/19





  15:43 15:43 15:43


 


WBC   3.1 L 


 


RDW   16.2 H 


 


Mono % (Auto)   


 


Lymph #   


 


Monocytes % (Manual)   11.0 H 


 


Seg Neutrophils #   


 


Seg Neutrophils # Man   1.7 L 


 


Lymphocytes # (Manual)   1.1 L 


 


D-Dimer  1149.82 H  


 


Sodium   


 


Potassium   


 


Chloride    97.4 L


 


BUN   


 


Creatinine    0.5 L


 


Glucose    103 H


 


ALT   


 


Total Protein   


 


Albumin   


 


Cholesterol   


 


LDL Cholesterol Direct   


 


Urine WBC (Auto)   














  03/29/19 03/30/19 03/30/19





  19:00 05:38 12:54


 


WBC    2.5 L


 


RDW    16.4 H


 


Mono % (Auto)    16.0 H


 


Lymph #    0.9 L


 


Monocytes % (Manual)   


 


Seg Neutrophils #    1.2 L


 


Seg Neutrophils # Man   


 


Lymphocytes # (Manual)   


 


D-Dimer   


 


Sodium   


 


Potassium   3.0 L 


 


Chloride   


 


BUN   5 L 


 


Creatinine   0.4 L 


 


Glucose   


 


ALT   6 L 


 


Total Protein   5.6 L 


 


Albumin   2.9 L 


 


Cholesterol   


 


LDL Cholesterol Direct   


 


Urine WBC (Auto)  27.0 H  














  03/31/19 03/31/19 04/01/19





  15:17 15:17 14:58


 


WBC  3.5 L  


 


RDW  16.5 H  


 


Mono % (Auto)   


 


Lymph #   


 


Monocytes % (Manual)   


 


Seg Neutrophils #   


 


Seg Neutrophils # Man   


 


Lymphocytes # (Manual)   


 


D-Dimer   


 


Sodium   


 


Potassium   3.2 L 


 


Chloride   


 


BUN   3 L 


 


Creatinine   0.6 L 


 


Glucose   


 


ALT   


 


Total Protein   


 


Albumin   


 


Cholesterol    216 H


 


LDL Cholesterol Direct    159 H


 


Urine WBC (Auto)   














  04/02/19 04/02/19





  06:19 06:19


 


WBC  2.2 L 


 


RDW  16.5 H 


 


Mono % (Auto)  


 


Lymph #  


 


Monocytes % (Manual)  


 


Seg Neutrophils #  


 


Seg Neutrophils # Man  


 


Lymphocytes # (Manual)  


 


D-Dimer  


 


Sodium   146 H


 


Potassium   3.1 L


 


Chloride   107.5 H


 


BUN   4 L


 


Creatinine   0.5 L


 


Glucose  


 


ALT  


 


Total Protein  


 


Albumin  


 


Cholesterol  


 


LDL Cholesterol Direct  


 


Urine WBC (Auto)

## 2019-04-02 NOTE — DISCHARGE SUMMARY
Providers





- Providers


Date of Admission: 


03/29/19 19:14





Date of discharge: 04/02/19


Attending physician: 


JOSE LUIS CARRANZA





                                        





03/29/19


Consult to Case Management [CONS] Routine 


   Services Needed at Discharge: Home Health Services


                                   Physical Therapy


   Notified:: case management





03/30/19 08:32


Consult to Physician [CONS] Routine 


   Comment: 


   Consulting Provider: PAULINO JAMES


   Physician Instructions: 


   Reason For Exam: Pulmonary embolism





04/01/19 08:38


Consult to Physician [CONS] Routine 


   Comment: 


   Consulting Provider: CROW GERARD


   Physician Instructions: 


   Reason For Exam: Confusion on and off for months.poss dementia











Primary care physician: 


CHANTAL HENDRICKS








Hospitalization


Reason for admission: dizziness


Condition: Stable


Hospital course: 





Patient is 86 yo with atrial fibrillation, recent pulmonary embolism 1-2 months 

ago who presented with confusion for several months and was seeing outpatient 

neurology for possible dementia. She was sent for outpatient MRI and while at 

facility developed shortness of breath, dizziness therefore brought to ED. CT 

Chest showed bilt PE which is probably residual from 1-2 months ago. She was 

started on Lovenox 1mg/kg bid. She was on Eliquis 5mg po once daily. Recommended

 dose for PE is 5mg po bid.  Therefore, Lovenox was discontinued and the patient

 was placed on on Eliquis 5mg po bid. Daughter requested neuro eval for altered 

mental status for months.  Patient was seen by Dr. Gerard. MRI Brain was 

unremarkable . Patient stable to dc home on Eliquis 5mg bid.  Dr. Gerard reports 

that the patient has mild cognitive impairment with anxiety.  No further workup.

  Patient is to follow-up with neurology as an outpatient.  Dedicated discharge 

time 32 minutes.





Disposition: DC-01 TO HOME OR SELFCARE


Time spent for discharge: 32





- Discharge Diagnoses


(1) Dizziness


Status: Acute   





(2) Pulmonary embolism


Status: Acute   





Core Measure Documentation





- Palliative Care


Palliative Care/ Comfort Measures: Not Applicable





- Core Measures


Any of the following diagnoses?: none





Exam





- Constitutional


Vitals: 


                                        











Temp Pulse Resp BP Pulse Ox


 


 98.3 F   80   18   138/80   100 


 


 04/02/19 11:42  04/02/19 11:42  04/02/19 11:42  04/02/19 11:42  04/02/19 11:42











General appearance: Present: no acute distress, well-nourished





- EENT


Eyes: Present: PERRL


ENT: hearing intact, clear oral mucosa





- Neck


Neck: Present: supple, normal ROM





- Respiratory


Respiratory effort: normal


Respiratory: bilateral: CTA





- Cardiovascular


Heart Sounds: Present: S1 & S2.  Absent: rub, click





- Extremities


Extremities: pulses symmetrical, No edema


Peripheral Pulses: within normal limits





- Abdominal


General gastrointestinal: Present: soft, non-tender, non-distended, normal bowel

 sounds


Female genitourinary: Present: normal





- Integumentary


Integumentary: Present: clear, warm, dry





- Musculoskeletal


Musculoskeletal: gait normal, strength equal bilaterally





- Psychiatric


Psychiatric: appropriate mood/affect, intact judgment & insight





- Neurologic


Neurologic: CNII-XII intact, moves all extremities





Plan


Activity: no restrictions


Weight Bearing Status: Weight Bear as Tolerated


Diet: regular


Follow up with: 


CHANTAL HENDRICKS [Other] - 3-5 Days


Prescriptions: 


Apixaban [Eliquis] 5 mg PO Q12HR #60 tablet


AtorvaSTATin [Lipitor] 40 mg PO QHS #30 tablet

## 2019-04-02 NOTE — MAGNETIC RESONANCE REPORT
PROCEDURE:  MR BRAIN WO CON 

 

TECHNIQUE:  MRI of the brain performed. Images include sagittal T1, axial gradient, axial T2, axial f
lair, coronal FLAIR, axial T1, axial diffusion and axial ADC map  

 

HISTORY:  Altered mental status,confusion 

 

COMPARISON: None 

 

FINDINGS: 

 

There is no diffusion restriction seen to indicate acute ischemia/infarct. 

There is no acute intracranial hemorrhage seen. 

There is no edema, mass effect or midline shift. 

Ventricular size is appropriate for brain volume. 

There is no significant signal abnormality seen. 

 

 

IMPRESSION:  There is no acute abnormality identified. 

 

This document is electronically signed by Olga Canales MD., April 2 2019 10:40:41 AM ET

## 2020-11-03 ENCOUNTER — HOSPITAL ENCOUNTER (EMERGENCY)
Dept: HOSPITAL 5 - ED | Age: 85
LOS: 1 days | Discharge: HOME | End: 2020-11-04
Payer: MEDICARE

## 2020-11-03 DIAGNOSIS — Z98.890: ICD-10-CM

## 2020-11-03 DIAGNOSIS — I25.2: ICD-10-CM

## 2020-11-03 DIAGNOSIS — R51.9: Primary | ICD-10-CM

## 2020-11-03 DIAGNOSIS — Z88.2: ICD-10-CM

## 2020-11-03 DIAGNOSIS — Z86.711: ICD-10-CM

## 2020-11-03 DIAGNOSIS — Z88.8: ICD-10-CM

## 2020-11-03 DIAGNOSIS — K59.00: ICD-10-CM

## 2020-11-03 DIAGNOSIS — R07.89: ICD-10-CM

## 2020-11-03 DIAGNOSIS — F03.90: ICD-10-CM

## 2020-11-03 DIAGNOSIS — Z79.899: ICD-10-CM

## 2020-11-03 PROCEDURE — 36415 COLL VENOUS BLD VENIPUNCTURE: CPT

## 2020-11-03 PROCEDURE — 71275 CT ANGIOGRAPHY CHEST: CPT

## 2020-11-03 PROCEDURE — 99285 EMERGENCY DEPT VISIT HI MDM: CPT

## 2020-11-03 PROCEDURE — 85025 COMPLETE CBC W/AUTO DIFF WBC: CPT

## 2020-11-03 PROCEDURE — 71045 X-RAY EXAM CHEST 1 VIEW: CPT

## 2020-11-03 PROCEDURE — 93005 ELECTROCARDIOGRAM TRACING: CPT

## 2020-11-03 PROCEDURE — 85610 PROTHROMBIN TIME: CPT

## 2020-11-03 PROCEDURE — 70450 CT HEAD/BRAIN W/O DYE: CPT

## 2020-11-03 PROCEDURE — 80076 HEPATIC FUNCTION PANEL: CPT

## 2020-11-03 PROCEDURE — 74177 CT ABD & PELVIS W/CONTRAST: CPT

## 2020-11-03 PROCEDURE — 85730 THROMBOPLASTIN TIME PARTIAL: CPT

## 2020-11-03 PROCEDURE — 80048 BASIC METABOLIC PNL TOTAL CA: CPT

## 2020-11-03 PROCEDURE — 84484 ASSAY OF TROPONIN QUANT: CPT

## 2020-11-03 PROCEDURE — 83690 ASSAY OF LIPASE: CPT

## 2020-11-04 VITALS — SYSTOLIC BLOOD PRESSURE: 138 MMHG | DIASTOLIC BLOOD PRESSURE: 99 MMHG

## 2020-11-04 LAB
ALBUMIN SERPL-MCNC: 3.6 G/DL (ref 3.9–5)
ALT SERPL-CCNC: 7 UNITS/L (ref 7–56)
APTT BLD: 31 SEC. (ref 24.2–36.6)
BASOPHILS # (AUTO): 0 K/MM3 (ref 0–0.1)
BASOPHILS NFR BLD AUTO: 0.4 % (ref 0–1.8)
BILIRUB DIRECT SERPL-MCNC: < 0.2 MG/DL (ref 0–0.2)
BUN SERPL-MCNC: 14 MG/DL (ref 7–17)
BUN/CREAT SERPL: 20 %
CALCIUM SERPL-MCNC: 9.3 MG/DL (ref 8.4–10.2)
EOSINOPHIL # BLD AUTO: 0.1 K/MM3 (ref 0–0.4)
EOSINOPHIL NFR BLD AUTO: 2.6 % (ref 0–4.3)
HCT VFR BLD CALC: 32.6 % (ref 30.3–42.9)
HEMOLYSIS INDEX: 1
HGB BLD-MCNC: 10.5 GM/DL (ref 10.1–14.3)
INR PPP: 0.97 (ref 0.87–1.13)
LYMPHOCYTES # BLD AUTO: 2.7 K/MM3 (ref 1.2–5.4)
LYMPHOCYTES NFR BLD AUTO: 51.3 % (ref 13.4–35)
MCHC RBC AUTO-ENTMCNC: 32 % (ref 30–34)
MCV RBC AUTO: 86 FL (ref 79–97)
MONOCYTES # (AUTO): 0.6 K/MM3 (ref 0–0.8)
MONOCYTES % (AUTO): 10.6 % (ref 0–7.3)
PLATELET # BLD: 214 K/MM3 (ref 140–440)
RBC # BLD AUTO: 3.8 M/MM3 (ref 3.65–5.03)

## 2020-11-04 NOTE — CAT SCAN REPORT
CT ABDOMEN AND PELVIS WITH IV CONTRAST



INDICATION:

Pt complains of nausea, generalized abd tenderness.



COMPARISON:

None available.



TECHNIQUE:

Axial CT images were obtained through the abdomen and pelvis after 100 mL IV contrast. All CT scans a
t this location are performed using CT dose reduction for ALARA by means of automated exposure contro
l. 



FINDINGS -- ABDOMEN:

Lung Bases: Cardiomegaly. Mild basilar atelectasis..

Liver: Multiple hepatic cysts present..

Gallbladder: Mild gallbladder wall thickening is identified. No gallstones appreciated..  Bile Ducts:
 Normal.

Pancreas: Normal.

Spleen: Normal.

Adrenals: Normal.

Right Kidney and Proximal Ureter: Normal.

Left Kidney and Proximal Ureter: Normal.

Stomach and Bowel: Normal.

Lymph Nodes: No significant adenopathy.

Aorta: No significant abnormality.  IVC: Normal.

Additional Findings: None.



FINDINGS -- PELVIS:

Urinary Bladder and Distal Ureters: Fluid distended urinary bladder..

Reproductive Organs: No acute abnormality.

Appendix: Not well identified.  Bowel: Large stool burden identified throughout the colon..

Free Fluid: None.

Lymph Nodes: No significant adenopathy.

Additional Findings: None.



Skeletal System: Severe streak artifact from bilateral hip arthroplasty.. There is mild anterior comp
ression deformity involving L2 and T12.



IMPRESSION:

1. Mild gallbladder wall thickening which is nonspecific but can be seen with Robina cystitis. No chol
elithiasis identified

2. Large stool burden identified throughout the colon.



Signer Name: Quinton Lloyd MD 

Signed: 11/4/2020 4:47 AM

Workstation Name: [id number]

## 2020-11-04 NOTE — CAT SCAN REPORT
CT head without contrast



INDICATION :  Headache.



TECHNIQUE:  Axial imaging performed from the skull apex through the skull base without the use of con
trast.  All CT examinations performed at this facility utilize dose modulation, iterative reconstruct
ion or weight-based dosing, when appropriate, to reduce radiation dose to as low as reasonably achiev
able.



COMPARISON:  3/29/2019



FINDINGS:  No acute intracranial hemorrhage or parenchymal abnormality.   Ventricles are normal in si
ze and appear symmetric.   Soft tissues including the orbits appear normal.   No acute osseous abnorm
ality.   Sinuses and mastoid air cells are clear. There is extensive atherosclerotic calcification wi
thin the visualized vertebral arteries and internal carotid arteries, unchanged





IMPRESSION: No definite acute intracranial abnormality or significant change from 3/29/2019



Signer Name: Quinton Lloyd MD 

Signed: 11/4/2020 4:52 AM

Workstation Name: [id number]

## 2020-11-04 NOTE — CAT SCAN REPORT
CTA CHEST WITH IV CONTRAST



INDICATION:

Acute onset chest pain with dyspnea.!!!  Pt complains of chest pain, hx of P.E..



TECHNIQUE:

Axial CT images were obtained through the chest after injection of 100 mL IV contrast. 3 plane MIP re
constructions were produced. All CT scans at this location are performed using CT dose reduction for 
ALARA by means of automated exposure control. 



COMPARISON:

None available.



FINDINGS:

PULMONARY ARTERIES: No pulmonary emboli.

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
.

MEDIASTINUM: Moderate cardiomegaly. LUNGS: No suspicious consolidation, nodule or mass.  No pneumotho
rax or pleural effusion.  



ADDITIONAL FINDINGS: None.



UPPER ABDOMEN: No acute findings.



BONES: No significant osseous abnormality.



IMPRESSION:

1. No CT evidence for pulmonary embolism. 

2. 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

3. Cardiomegaly.



Signer Name: Quinton Lloyd MD 

Signed: 11/4/2020 4:49 AM

Workstation Name: [id number]

## 2020-11-04 NOTE — XRAY REPORT
CHEST 1 VIEW 



INDICATION / CLINICAL INFORMATION:

Chest Pain.





FINDINGS:



SUPPORT DEVICES: None.



HEART / MEDIASTINUM: Borderline enlarged. 



LUNGS / PLEURA: No significant pulmonary or pleural abnormality. No pneumothorax. 



ADDITIONAL FINDINGS: No significant additional findings.



IMPRESSION:

1. No acute findings.



Signer Name: Quinton Lloyd MD 

Signed: 11/4/2020 12:46 AM

Workstation Name: [id number]

## 2020-11-04 NOTE — EMERGENCY DEPARTMENT REPORT
ED Chest Pain HPI





- General


Chief Complaint: Chest Pain


Stated Complaint: HEADACHE


Time Seen by Provider: 11/04/20 00:47


Source: patient, EMS, old records reviewed


Mode of arrival: Stretcher


Limitations: Other





- History of Present Illness


Initial Comments: 





88-year-old female with a past medical history of atrial fibrillation, pulmonary

embolism, hemorrhagic CVA with residual deficits, and dementia presents to the 

hospital with complaints of headache and chest pain.  As per triage patient 

complained of headache left-sided chest pain started today.  Positive nausea 

without vomiting.  Patient is oriented to self and knows she is in the hospital 

but does not know the year.  She at time of my evaluation she states that all 

her symptoms have resolved.  She states she is not currently on anticoagulation 

because her doctor took her off the medication.  She is inquiring why she is 

getting an EKG and white blood work was ordered.  As per medical record patient 

does have some mild cognitive impairment

















Severity scale (0 -10): 0





- Related Data


                                  Previous Rx's











 Medication  Instructions  Recorded  Last Taken  Type


 


Apixaban [Eliquis] 5 mg PO Q12HR #60 tablet 04/02/19 Unknown Rx


 


AtorvaSTATin [Lipitor] 40 mg PO QHS #30 tablet 04/02/19 Unknown Rx


 


Acetaminophen [Acetaminophen TAB] 500 mg PO Q4HR PRN #20 tablet 11/04/20 Unknown

Rx


 


Docusate Sodium [Colace] 100 mg PO BID PRN #20 capsule 11/04/20 Unknown Rx


 


Ondansetron [Zofran Odt] 4 mg PO Q8HR PRN #15 tab.rapdis 11/04/20 Unknown Rx


 


Polyethylene Glycol 3350 [Miralax] 17 gm PO CONT PRN #7 dose 11/04/20 Unknown Rx











                                    Allergies











Allergy/AdvReac Type Severity Reaction Status Date / Time


 


No Known Allergies Allergy   Verified 03/29/19 23:19














Heart Score





- HEART Score


History: Slightly suspicious


EKG: Normal


Age: > 65


Risk factors: 1-2 risk factors


Troponin: < normal limit


HEART Score: 3





ED Review of Systems


ROS: 


Stated complaint: HEADACHE


Other details as noted in HPI





Comment: All other systems reviewed and negative





ED Past Medical Hx





- Past Medical History


Previous Medical History?: Yes


Hx Heart Attack/AMI: Yes


Hx Pulmonary Embolism: Yes


Hx Dementia: Yes (not diagnosised)


Additional medical history: bld clots, a-fib





- Surgical History


Past Surgical History?: Yes


Additional Surgical History: B/L Hip





- Social History


Smoking Status: Never Smoker


Substance Use Type: None





- Medications


Home Medications: 


                                Home Medications











 Medication  Instructions  Recorded  Confirmed  Last Taken  Type


 


Apixaban [Eliquis] 5 mg PO Q12HR #60 tablet 04/02/19  Unknown Rx


 


AtorvaSTATin [Lipitor] 40 mg PO QHS #30 tablet 04/02/19  Unknown Rx


 


Acetaminophen [Acetaminophen TAB] 500 mg PO Q4HR PRN #20 tablet 11/04/20  

Unknown Rx


 


Docusate Sodium [Colace] 100 mg PO BID PRN #20 capsule 11/04/20  Unknown Rx


 


Ondansetron [Zofran Odt] 4 mg PO Q8HR PRN #15 tab.rapdis 11/04/20  Unknown Rx


 


Polyethylene Glycol 3350 [Miralax] 17 gm PO CONT PRN #7 dose 11/04/20  Unknown 

Rx














ED Physical Exam





- General


Limitations: Other





- Other


Other exam information: 





General: No acute distress


Head: Atraumatic


Eyes: normal appearance


ENT: Moist mucous membranes


Neck: Normal appearance, no midline tenderness


Chest: Clear to auscultation bilaterally, chest wall nontender


CV: Regular rate and rhythm


Abdomen: Soft, normal bowel sounds, diffuse abdominal tenderness, nondistended, 

no rebound or guarding


Back: Normal inspection


Extremity: Normal inspection, full range of motion


Neuro: Alert O x 2, no facial asymmetry, speech clear, bilateral hand 

contractions and bilateral leg weakness secondary to hemorrhagic CVA which is 

unchanged as per patient.  Right leg weaker than left.


Psych: Appropriate behavior


Skin: No rash





ED Course


                                   Vital Signs











  11/04/20 11/04/20 11/04/20





  00:18 00:20 00:21


 


Temperature   


 


Pulse Rate 80 80 80


 


Respiratory 9 L 17 14





Rate   


 


Blood Pressure   166/83


 


O2 Sat by Pulse  97 99





Oximetry   














  11/04/20 11/04/20 11/04/20





  00:22 00:24 00:30


 


Temperature  97.8 F 


 


Pulse Rate 80  80


 


Respiratory 16  20





Rate   


 


Blood Pressure 166/83  147/89


 


O2 Sat by Pulse 97  97





Oximetry   














  11/04/20 11/04/20 11/04/20





  00:46 01:00 01:16


 


Temperature   


 


Pulse Rate 78 76 71


 


Respiratory 20 21 14





Rate   


 


Blood Pressure 147/89 151/87 151/87


 


O2 Sat by Pulse 97 97 98





Oximetry   














  11/04/20 11/04/20 11/04/20





  01:30 01:46 02:00


 


Temperature   


 


Pulse Rate 70 68 74


 


Respiratory 18 15 16





Rate   


 


Blood Pressure 145/91 145/91 154/88


 


O2 Sat by Pulse 98 98 98





Oximetry   














  11/04/20 11/04/20 11/04/20





  02:02 02:16 02:30


 


Temperature   


 


Pulse Rate 73 74 70


 


Respiratory 16 17 17





Rate   


 


Blood Pressure  154/88 154/88


 


O2 Sat by Pulse 99 98 97





Oximetry   














  11/04/20 11/04/20 11/04/20





  02:46 03:00 03:16


 


Temperature   


 


Pulse Rate 68 78 75


 


Respiratory 20 20 19





Rate   


 


Blood Pressure 155/89 155/89 162/90


 


O2 Sat by Pulse 98 98 98





Oximetry   














  11/04/20 11/04/20 11/04/20





  03:30 04:06 04:30


 


Temperature   


 


Pulse Rate 82 94 H 94 H


 


Respiratory 22 17 10 L





Rate   


 


Blood Pressure 164/102  


 


O2 Sat by Pulse 98 97 96





Oximetry   














- Consultations


Consultation #1: 





11/04/20 05:52


Case discussed with on-call cardiologist Dr. Allred who is in agreement that 

patient may go home and follow-up as outpatient regarding her chest pain 

complaint





THIAGO score





- Thigao Score


Age > 65: (0) No


Aspirin use within the Past 7 Days: (0) No


3 or more CAD Risk Factors: (0) No


2 or more Angina events in past 24 hrs: (0) No


Known CAD with more than 50% Stenosis: (0) No


Elevated Cardiac Markers: (0) No


ST Deviation Greater than 0.5mm: (0) No


THIAGO Score: 0





ED Medical Decision Making





- Lab Data


Result diagrams: 


                                 11/04/20 00:32





                                 11/04/20 00:32








                                   Lab Results











  11/04/20 11/04/20 11/04/20 Range/Units





  00:32 00:32 00:38 


 


WBC  5.2    (4.5-11.0)  K/mm3


 


RBC  3.80    (3.65-5.03)  M/mm3


 


Hgb  10.5    (10.1-14.3)  gm/dl


 


Hct  32.6    (30.3-42.9)  %


 


MCV  86    (79-97)  fl


 


MCH  28    (28-32)  pg


 


MCHC  32    (30-34)  %


 


RDW  16.4 H    (13.2-15.2)  %


 


Plt Count  214    (140-440)  K/mm3


 


Lymph % (Auto)  51.3 H    (13.4-35.0)  %


 


Mono % (Auto)  10.6 H    (0.0-7.3)  %


 


Eos % (Auto)  2.6    (0.0-4.3)  %


 


Baso % (Auto)  0.4    (0.0-1.8)  %


 


Lymph # (Auto)  2.7    (1.2-5.4)  K/mm3


 


Mono # (Auto)  0.6    (0.0-0.8)  K/mm3


 


Eos # (Auto)  0.1    (0.0-0.4)  K/mm3


 


Baso # (Auto)  0.0    (0.0-0.1)  K/mm3


 


Seg Neutrophils %  35.1 L    (40.0-70.0)  %


 


Seg Neutrophils #  1.8    (1.8-7.7)  K/mm3


 


PT    13.1  (12.2-14.9)  Sec.


 


INR    0.97  (0.87-1.13)  


 


APTT    31.0  (24.2-36.6)  Sec.


 


Sodium   145   (137-145)  mmol/L


 


Potassium   3.5 L   (3.6-5.0)  mmol/L


 


Chloride   105.3   ()  mmol/L


 


Carbon Dioxide   28   (22-30)  mmol/L


 


Anion Gap   15   mmol/L


 


BUN   14   (7-17)  mg/dL


 


Creatinine   0.7   (0.6-1.2)  mg/dL


 


Estimated GFR   > 60   ml/min


 


BUN/Creatinine Ratio   20   %


 


Glucose   98   ()  mg/dL


 


Calcium   9.3   (8.4-10.2)  mg/dL


 


Troponin T   < 0.010   (0.00-0.029)  ng/mL














  11/04/20 Range/Units





  03:16 


 


WBC   (4.5-11.0)  K/mm3


 


RBC   (3.65-5.03)  M/mm3


 


Hgb   (10.1-14.3)  gm/dl


 


Hct   (30.3-42.9)  %


 


MCV   (79-97)  fl


 


MCH   (28-32)  pg


 


MCHC   (30-34)  %


 


RDW   (13.2-15.2)  %


 


Plt Count   (140-440)  K/mm3


 


Lymph % (Auto)   (13.4-35.0)  %


 


Mono % (Auto)   (0.0-7.3)  %


 


Eos % (Auto)   (0.0-4.3)  %


 


Baso % (Auto)   (0.0-1.8)  %


 


Lymph # (Auto)   (1.2-5.4)  K/mm3


 


Mono # (Auto)   (0.0-0.8)  K/mm3


 


Eos # (Auto)   (0.0-0.4)  K/mm3


 


Baso # (Auto)   (0.0-0.1)  K/mm3


 


Seg Neutrophils %   (40.0-70.0)  %


 


Seg Neutrophils #   (1.8-7.7)  K/mm3


 


PT   (12.2-14.9)  Sec.


 


INR   (0.87-1.13)  


 


APTT   (24.2-36.6)  Sec.


 


Sodium   (137-145)  mmol/L


 


Potassium   (3.6-5.0)  mmol/L


 


Chloride   ()  mmol/L


 


Carbon Dioxide   (22-30)  mmol/L


 


Anion Gap   mmol/L


 


BUN   (7-17)  mg/dL


 


Creatinine   (0.6-1.2)  mg/dL


 


Estimated GFR   ml/min


 


BUN/Creatinine Ratio   %


 


Glucose   ()  mg/dL


 


Calcium   (8.4-10.2)  mg/dL


 


Troponin T  < 0.010  (0.00-0.029)  ng/mL














- EKG Data


-: EKG Interpreted by Me (A lot of artifact, left anterior fascicular block, 

possible anterior MI)


EKG shows normal: sinus rhythm, ST-T waves (No STEMI)


Rate: normal





- EKG Data





11/04/20 04:59


Repeat EKG performed at 4:44 AM shows sinus rhythm without ST elevation MI, T 

wave inversions.  Positive left anterior fascicular block.  Possible anterior





- Radiology Data


Radiology results: report reviewed





CHEST 1 VIEW 





INDICATION / CLINICAL INFORMATION: 


Chest Pain. 








FINDINGS: 





SUPPORT DEVICES: None. 





HEART / MEDIASTINUM: Borderline enlarged. 





LUNGS / PLEURA: No significant pulmonary or pleural abnormality. No 

pneumothorax. 





ADDITIONAL FINDINGS: No significant additional findings. 





IMPRESSION: 


1. No acute findings. 





CTA CHEST WITH IV CONTRAST 





INDICATION: 


Acute onset chest pain with dyspnea.!!! Pt complains of chest pain, hx of P.E.. 





TECHNIQUE: 


Axial CT images were obtained through the chest after injection of 100 mL IV 

contrast. 3 plane MIP 


 reconstructions were produced. All CT scans at this location are performed 

using CT dose reduction 


 for ALARA by means of automated exposure control. 





COMPARISON: 


None available. 





FINDINGS: 


PULMONARY ARTERIES: No pulmonary emboli. 


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 

about 4.5 cm in 


 diameter.. 


MEDIASTINUM: Moderate cardiomegaly. LUNGS: No suspicious consolidation, nodule 

or mass. No 


 pneumothorax or pleural effusion. 





ADDITIONAL FINDINGS: None. 





UPPER ABDOMEN: No acute findings. 





BONES: No significant osseous abnormality. 





IMPRESSION: 


1. No CT evidence for pulmonary embolism. 


2. 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 


3. Cardiomegaly. 





CT head without contrast 





INDICATION : Headache. 





TECHNIQUE: Axial imaging performed from the skull apex through the skull base 

without the use of 


 contrast. All CT examinations performed at this facility utilize dose 

modulation, iterative 


 reconstruction or weight-based dosing, when appropriate, to reduce radiation 

dose to as low as 


 reasonably achievable. 





COMPARISON: 3/29/2019 





FINDINGS: No acute intracranial hemorrhage or parenchymal abnormality. 

Ventricles are normal in 


 size and appear symmetric. Soft tissues including the orbits appear normal. No 

acute osseous 


 abnormality. Sinuses and mastoid air cells are clear. There is extensive 

atherosclerotic 


 calcification within the visualized vertebral arteries and internal carotid 

arteries, unchanged 








IMPRESSION: No definite acute intracranial abnormality or significant change 

from 3/29/2019 





- Medical Decision Making





Patient did refuse IV line placement and further testing.  Call patient's 

daughter Kimberly Macias on the phone and she started wheezing with her mother.  

Then she gave permission for us to go ahead and place IV and proceed with 

studies as planned since patient does have history of dementia/cognitive 

impairment.  She states that her mother complain of some nausea, headache, not 

feeling well, "a little bit of chest pain.  Patient underwent CT head, CT 

angiogram chest, CT abdomen and pelvis with IV contrast as well as labs.  Mild 

gallbladder wall thickening identified on CT without other signs of 

cholecystitis and normal LFTs and lipase patient has some mild generalized pain 

upon initial evaluation however, since being in the ED she states pain has 

resolved.  Patient also denies chest pain or headache since arrival to the ED.  

EKG without ischemic findings x2.  Troponin negative x2.  P.o. potassium ordered

 for mild hypokalemia





Discussed with cardiologist agree patient can follow-up regarding chest





Discussed with daughter in inform patient will be sent back home with follow-up


Critical Care Time: No


Critical care attestation.: 


If time is entered above; I have spent that time in minutes in the direct care 

of this critically ill patient, excluding procedure time.








ED Disposition


Clinical Impression: 


 Headache, Atypical chest pain, Constipation





Disposition: DC-01 TO HOME OR SELFCARE


Is pt being admited?: No


Does the pt Need Aspirin: No


Instructions:  Chest Pain (ED), Acute Headache (ED), Constipation (ED)


Additional Instructions: 


Take the medication as prescribed.  Follow-up with your doctor or doctor/clinic 

provided.  Return if symptoms worsen as indicated by your discharge 

instructions.


Prescriptions: 


Acetaminophen [Acetaminophen TAB] 500 mg PO Q4HR PRN #20 tablet


 PRN Reason: Pain , Severe (7-10)


Docusate Sodium [Colace] 100 mg PO BID PRN #20 capsule


 PRN Reason: Constipation


Polyethylene Glycol 3350 [Miralax] 17 gm PO CONT PRN #7 dose


 PRN Reason: Constipation


Ondansetron [Zofran Odt] 4 mg PO Q8HR PRN #15 tab.rapdis


 PRN Reason: Nausea And Vomiting


Referrals: 


CHANTAL HENDRICKS MD [Primary Care Provider] - 3-5 Days


Time of Disposition: 05:57